# Patient Record
Sex: FEMALE | Race: WHITE | Employment: UNEMPLOYED | URBAN - METROPOLITAN AREA
[De-identification: names, ages, dates, MRNs, and addresses within clinical notes are randomized per-mention and may not be internally consistent; named-entity substitution may affect disease eponyms.]

---

## 2017-02-08 ENCOUNTER — THERAPY VISIT (OUTPATIENT)
Dept: PHYSICAL THERAPY | Facility: CLINIC | Age: 47
End: 2017-02-08
Payer: COMMERCIAL

## 2017-02-08 DIAGNOSIS — H81.10 BPPV (BENIGN PAROXYSMAL POSITIONAL VERTIGO), UNSPECIFIED LATERALITY: Primary | ICD-10-CM

## 2017-02-08 PROCEDURE — 95992 CANALITH REPOSITIONING PROC: CPT | Mod: GP | Performed by: PHYSICAL THERAPIST

## 2017-02-08 PROCEDURE — 97161 PT EVAL LOW COMPLEX 20 MIN: CPT | Mod: GP | Performed by: PHYSICAL THERAPIST

## 2017-02-08 NOTE — PROGRESS NOTES
"[facility] Initial Evaluation      S:  Papa Ruiz is a 46 year old patient complaining of vertigo and foggy feeling.  Onset of symptoms: Last month or two sx have been present, but last week has been the worst      Is this a recurrent problem: Last February had the same thing that was treated by PT  Progression since onset: Slowly getting worse  Frequency of episodes/time of day: 5 -6 x/ day.  Fogginess is constant throughout day.  Duration of episodes: 1 -2 minutes  Exacerbating factors: bending; rolling in bed;  Prolong walking  Relieving factors: sit still  Previous treatments:  Previous PT  Special tests/diagnostics performed: none  Significant medical hx: smoking, menopausal  Meds: ----  Occupation: none   Work requirements: 6 year old twins at home  General health reported by patient: good  Red Flags: none      O:  Cervical ROM screen: full - increases foggy feeling  Oculomotor/gaze stability screen: all tests negative  Vertebral artery test: negative  Hallpike-[place] maneuver:  R: negative  L: negative -slight dizziness only - mainly \"foggy\"  Horizontal canal test:  R: NT  L: NT  Balance testing:  NT          Subjective:    HPI                    Objective:    System    Physical Exam    General     ROS    Assessment/Plan:      Patient is a 46 year old female with vertigo complaints.    Patient has the following significant findings with corresponding treatment plan.                Diagnosis 1:  vertigo  Decreased proprioception - neuro re-education and therapeutic activities  Decreased function - therapeutic activities    Therapy Evaluation Codes:   1) History comprised of:   Personal factors that impact the plan of care:      Anxiety and None.    Comorbidity factors that impact the plan of care are:      Menopausal and Smoking.     Medications impacting care: None.  2) Examination of Body Systems comprised of:   Body structures and functions that impact the plan of care:  "     vertigo.   Activity limitations that impact the plan of care are:      Bending and Laying down.  3) Clinical presentation characteristics are:   Stable/Uncomplicated.  4) Decision-Making    Low complexity using standardized patient assessment instrument and/or measureable assessment of functional outcome.  Cumulative Therapy Evaluation is: Low complexity.    Previous and current functional limitations:  (See Goal Flow Sheet for this information)    Short term and Long term goals: (See Goal Flow Sheet for this information)     Communication ability:  Patient appears to be able to clearly communicate and understand verbal and written communication and follow directions correctly.  Treatment Explanation - The following has been discussed with the patient:   RX ordered/plan of care  Anticipated outcomes  Possible risks and side effects  This patient would benefit from PT intervention to resume normal activities. and Pt instructed to make appointment with MD to assess for other issues   Rehab potential is good.    Frequency:  1 X week, once daily  Duration:  for 4 weeks  Discharge Plan:  Achieve all LTG.  Independent in home treatment program.  Reach maximal therapeutic benefit.    Pt instructed to F/U with MD prior to scheduling additional visits.    Please refer to the daily flowsheet for treatment today, total treatment time and time spent performing 1:1 timed codes.

## 2017-02-08 NOTE — Clinical Note
"[facility] ATHLETIC [facility] PT  [address] [place]. \Hospital Has a New Name and Outlook.\"" [id number]  [phone]    2017    Re: Papa Ruiz   :   1970  MRN:  [id number]     REFERRING PHYSICIAN:   Javad Martinez  [facility] PT  Date of Initial Evaluation:  2017  Visits:1     Reason for Referral:  BPPV (benign paroxysmal positional vertigo), unspecified laterality  EVALUATION SUMMARY  [facility] Initial Evaluation  S:  Papa Ruiz is a 46 year old patient complaining of vertigo and foggy feeling.  Onset of symptoms: Last month or two sx have been present, but last week has been the worst      Is this a recurrent problem: Last February had the same thing that was treated by PT  Progression since onset: Slowly getting worse  Frequency of episodes/time of day: 5 -6 x/ day.  Fogginess is constant throughout day.  Duration of episodes: 1 -2 minutes  Exacerbating factors: bending; rolling in bed;  Prolong walking  Relieving factors: sit still  Previous treatments:  Previous PT  Special tests/diagnostics performed: none  Significant medical hx: smoking, menopausal  Meds: ----  Occupation: none   Work requirements: 6 year old twins at home  General health reported by patient: good  Red Flags: none  O:  Cervical ROM screen: full - increases foggy feeling  Oculomotor/gaze stability screen: all tests negative  Vertebral artery test: negative  Hallpike-[place] maneuver:  R: negative  L: negative -slight dizziness only - mainly \"foggy\"  Horizontal canal test:  R: NT  L: NT  Balance testing:  NT  Patient is a 46 year old female with vertigo complaints.    Patient has the following significant findings with corresponding treatment plan.                Diagnosis 1:  vertigo  Decreased proprioception - neuro re-education and therapeutic activities  Decreased function - therapeutic activities  Therapy Evaluation Codes:   1) History comprised " of:   Personal factors that impact the plan of care:      Anxiety and None.   Re: Papa Ruiz   :   1970         Comorbidity factors that impact the plan of care are:      Menopausal and Smoking.     Medications impacting care: None.  2) Examination of Body Systems comprised of:   Body structures and functions that impact the plan of care:      vertigo.   Activity limitations that impact the plan of care are:      Bending and Laying down.  3) Clinical presentation characteristics are:   Stable/Uncomplicated.  4) Decision-Making    Low complexity using standardized patient assessment instrument and/or measureable assessment of functional outcome.  Cumulative Therapy Evaluation is: Low complexity.    Previous and current functional limitations:  (See Goal Flow Sheet for this information)    Short term and Long term goals: (See Goal Flow Sheet for this information)     Communication ability:  Patient appears to be able to clearly communicate and understand verbal and written communication and follow directions correctly.  Treatment Explanation - The following has been discussed with the patient:   RX ordered/plan of care  Anticipated outcomes  Possible risks and side effects  This patient would benefit from PT intervention to resume normal activities. and Pt instructed to make appointment with MD to assess for other issues   Rehab potential is good.  Frequency:  1 X week, once daily  Duration:  for 4 weeks  Discharge Plan:  Achieve all LTG.  Independent in home treatment program.  Reach maximal therapeutic benefit.  Pt instructed to F/U with MD prior to scheduling additional visits.    Thank you for your referral.    INQUIRIES  Therapist: Harinder Cottrell PT  [facility]  [address] [facility] [address]  Phone: [phone]  Fax: [phone]

## 2017-02-20 ENCOUNTER — RADIANT APPOINTMENT (OUTPATIENT)
Dept: MAMMOGRAPHY | Facility: CLINIC | Age: 47
End: 2017-02-20
Attending: PHYSICIAN ASSISTANT
Payer: COMMERCIAL

## 2017-02-20 DIAGNOSIS — Z13.9 SCREENING: ICD-10-CM

## 2017-02-20 PROCEDURE — G0202 SCR MAMMO BI INCL CAD: HCPCS | Performed by: RADIOLOGY

## 2017-02-22 ENCOUNTER — OFFICE VISIT (OUTPATIENT)
Dept: PEDIATRICS | Facility: CLINIC | Age: 47
End: 2017-02-22
Payer: COMMERCIAL

## 2017-02-22 VITALS
OXYGEN SATURATION: 100 % | TEMPERATURE: 97 F | HEIGHT: 66 IN | WEIGHT: 142.9 LBS | SYSTOLIC BLOOD PRESSURE: 116 MMHG | DIASTOLIC BLOOD PRESSURE: 81 MMHG | BODY MASS INDEX: 22.97 KG/M2 | HEART RATE: 80 BPM

## 2017-02-22 DIAGNOSIS — R42 LIGHTHEADEDNESS: ICD-10-CM

## 2017-02-22 DIAGNOSIS — F41.9 ANXIETY: Primary | ICD-10-CM

## 2017-02-22 DIAGNOSIS — R74.8 ELEVATED LIPASE: ICD-10-CM

## 2017-02-22 LAB
ALBUMIN SERPL-MCNC: 4 G/DL (ref 3.4–5)
ALP SERPL-CCNC: 82 U/L (ref 40–150)
ALT SERPL W P-5'-P-CCNC: 29 U/L (ref 0–50)
ANION GAP SERPL CALCULATED.3IONS-SCNC: 6 MMOL/L (ref 3–14)
AST SERPL W P-5'-P-CCNC: 31 U/L (ref 0–45)
BASOPHILS # BLD AUTO: 0.1 10E9/L (ref 0–0.2)
BASOPHILS NFR BLD AUTO: 0.9 %
BILIRUB SERPL-MCNC: 0.5 MG/DL (ref 0.2–1.3)
BUN SERPL-MCNC: 8 MG/DL (ref 7–30)
CALCIUM SERPL-MCNC: 8.8 MG/DL (ref 8.5–10.1)
CHLORIDE SERPL-SCNC: 103 MMOL/L (ref 94–109)
CO2 SERPL-SCNC: 27 MMOL/L (ref 20–32)
CREAT SERPL-MCNC: 0.72 MG/DL (ref 0.52–1.04)
DIFFERENTIAL METHOD BLD: ABNORMAL
EOSINOPHIL # BLD AUTO: 0.4 10E9/L (ref 0–0.7)
EOSINOPHIL NFR BLD AUTO: 6.6 %
ERYTHROCYTE [DISTWIDTH] IN BLOOD BY AUTOMATED COUNT: 12.1 % (ref 10–15)
GFR SERPL CREATININE-BSD FRML MDRD: 87 ML/MIN/1.7M2
GLUCOSE SERPL-MCNC: 92 MG/DL (ref 70–99)
HCT VFR BLD AUTO: 38.2 % (ref 35–47)
HGB BLD-MCNC: 13.5 G/DL (ref 11.7–15.7)
LIPASE SERPL-CCNC: 405 U/L (ref 73–393)
LYMPHOCYTES # BLD AUTO: 1.4 10E9/L (ref 0.8–5.3)
LYMPHOCYTES NFR BLD AUTO: 24.9 %
MCH RBC QN AUTO: 35.4 PG (ref 26.5–33)
MCHC RBC AUTO-ENTMCNC: 35.3 G/DL (ref 31.5–36.5)
MCV RBC AUTO: 100 FL (ref 78–100)
MONOCYTES # BLD AUTO: 0.6 10E9/L (ref 0–1.3)
MONOCYTES NFR BLD AUTO: 9.8 %
NEUTROPHILS # BLD AUTO: 3.2 10E9/L (ref 1.6–8.3)
NEUTROPHILS NFR BLD AUTO: 57.8 %
PLATELET # BLD AUTO: 275 10E9/L (ref 150–450)
POTASSIUM SERPL-SCNC: 4.1 MMOL/L (ref 3.4–5.3)
PROT SERPL-MCNC: 8.2 G/DL (ref 6.8–8.8)
RBC # BLD AUTO: 3.81 10E12/L (ref 3.8–5.2)
SODIUM SERPL-SCNC: 136 MMOL/L (ref 133–144)
T3 SERPL-MCNC: 90 NG/DL (ref 60–181)
T4 FREE SERPL-MCNC: 0.83 NG/DL (ref 0.76–1.46)
TSH SERPL DL<=0.05 MIU/L-ACNC: 2.67 MU/L (ref 0.4–4)
WBC # BLD AUTO: 5.6 10E9/L (ref 4–11)

## 2017-02-22 PROCEDURE — 36415 COLL VENOUS BLD VENIPUNCTURE: CPT | Performed by: INTERNAL MEDICINE

## 2017-02-22 PROCEDURE — 99214 OFFICE O/P EST MOD 30 MIN: CPT | Performed by: INTERNAL MEDICINE

## 2017-02-22 PROCEDURE — 83690 ASSAY OF LIPASE: CPT | Performed by: INTERNAL MEDICINE

## 2017-02-22 PROCEDURE — 84480 ASSAY TRIIODOTHYRONINE (T3): CPT | Performed by: INTERNAL MEDICINE

## 2017-02-22 PROCEDURE — 80050 GENERAL HEALTH PANEL: CPT | Performed by: INTERNAL MEDICINE

## 2017-02-22 PROCEDURE — 84439 ASSAY OF FREE THYROXINE: CPT | Performed by: INTERNAL MEDICINE

## 2017-02-22 NOTE — NURSING NOTE
"Chief Complaint   Patient presents with     Dizziness     Anxiety       Initial /81 (BP Location: Right arm, Patient Position: Chair, Cuff Size: Adult Regular)  Pulse 80  Temp 97  F (36.1  C) (Oral)  Ht 5' 6.25\" (1.683 m)  Wt 142 lb 14.4 oz (64.8 kg)  SpO2 100%  BMI 22.89 kg/m2 Estimated body mass index is 22.89 kg/(m^2) as calculated from the following:    Height as of this encounter: 5' 6.25\" (1.683 m).    Weight as of this encounter: 142 lb 14.4 oz (64.8 kg).  Medication Reconciliation: complete       Romana Najera CMA    "

## 2017-02-22 NOTE — MR AVS SNAPSHOT
After Visit Summary   2017    Papa Ruiz    MRN: [id number]           Patient Information     Date Of Birth          1970        Visit Information        Provider Department      2017 9:40 AM Jose Shankar MD [facility]        Today's Diagnoses     Anxiety    -  1    Lightheadedness        Elevated lipase          Care Instructions    No caffiene    No alcohol    Go to lab    Return next week        Follow-ups after your visit        Follow-up notes from your care team     Return in about 1 year (around 2018).      Who to contact     If you have questions or need follow up information about today's clinic visit or your schedule please contact [facility] directly at [phone].  Normal or non-critical lab and imaging results will be communicated to you by MyChart, letter or phone within 4 business days after the clinic has received the results. If you do not hear from us within 7 days, please contact the clinic through MyChart or phone. If you have a critical or abnormal lab result, we will notify you by phone as soon as possible.  Submit refill requests through [employer] or call your pharmacy and they will forward the refill request to us. Please allow 3 business days for your refill to be completed.          Additional Information About Your Visit        MyChart Information     [employer] is an electronic gateway that provides easy, online access to your medical records. With [employer], you can request a clinic appointment, read your test results, renew a prescription or communicate with your care team.     To sign up for [employer] visit the website at [email or web address]   You will be asked to enter the access code listed below, as well as some personal information. Please follow the directions to create your username and password.     Your access code is: [email or web address]  Expires: 2017 10:14 AM     Your access code will  in  "90 days. If you need help or a new code, please contact your [facility] or call [phone] for assistance.        Care EveryWhere ID     This is your Care EveryWhere ID. This could be used by other organizations to access your [place] medical records  [id number]        Your Vitals Were     Pulse Temperature Height Pulse Oximetry BMI (Body Mass Index)       80 97  F (36.1  C) (Oral) 5' 6.25\" (1.683 m) 100% 22.89 kg/m2        Blood Pressure from Last 3 Encounters:   02/22/17 116/81   09/14/16 122/76   02/19/16 116/77    Weight from Last 3 Encounters:   02/22/17 142 lb 14.4 oz (64.8 kg)   09/14/16 136 lb 12.8 oz (62.1 kg)   06/11/15 140 lb (63.5 kg)              We Performed the Following     CBC with platelets and differential     Comprehensive metabolic panel (BMP + Alb, Alk Phos, ALT, AST, Total. Bili, TP)     Lipase     T3, total     T4, free     TSH        Primary Care Provider Office Phone # Fax #    Taylor Calvert PA-C [phone]       [facility] [place] [address] [facility] [address]        Thank you!     Thank you for choosing [facility]  for your care. Our goal is always to provide you with excellent care. Hearing back from our patients is one way we can continue to improve our services. Please take a few minutes to complete the written survey that you may receive in the mail after your visit with us. Thank you!             Your Updated Medication List - Protect others around you: Learn how to safely use, store and throw away your medicines at www.disposemymeds.org.          This list is accurate as of: 2/22/17 10:14 AM.  Always use your most recent med list.                   Brand Name Dispense Instructions for use    aspirin 81 MG chewable tablet      Take 81 mg by mouth daily       MULTIVITAMIN ADULT PO            "

## 2017-02-22 NOTE — PROGRESS NOTES
SUBJECTIVE:                                                    Papa Ruiz is a 46 year old female who presents to clinic today for the following health issues:      Anxiety Follow-Up- pt. States she was diagnosed x 1 year ago    Status since last visit: Worsened     Other associated symptoms:Dizziness , fogginess     Complicating factors:   Significant life event: No- pt states she has always been a nervous person  Current substance abuse: None  Depression symptoms: No  MITZY-7 SCORE 9/14/2016   Total Score 7        GAD7       Dizziness     Onset: Vertigo was diagnosed 1 year ago and last 10 months has worsened    Description:   Do you feel faint:  YES  Does it feel like the surroundings (bed, room) are moving: no   Unsteady/off balance: YES  Have you passed out or fallen: no     Intensity: severe, 8/10    Progression of Symptoms:  worsening and constant    Accompanying Signs & Symptoms:  Heart palpitations: no   Nausea, vomiting: YES- nausea   Weakness in arms or legs: no   Fatigue: YES  Vision or speech changes: YES- pt. States her vision is foggy and isn't able to complete sentences   Ringing in ears (Tinnitus): no   Hearing Loss: no     Pt. States she has been forgetful    History:   Head trauma/concussion hx: no   Previous similar symptoms: YES- last year or two  Recent bleeding history: no     Precipitating factors:   Worse with activity or head movement: YES  Any new medications (BP?): no   Alcohol/drug abuse/withdrawal: no     Alleviating factors:   Does staying in a fixed position give relief:  YES      Pt. States she may have had a minor stroke 4 years ago she was unable to stand up for 30 mins.    Pt. States she was seen at [facility] for her vertigo.     Therapies Tried and outcome: Pt. States she has not taken anything for her vertigo.      HPI:     PMH:   Patient Active Problem List   Diagnosis     Transient neurologic deficit     Disturbance of skin sensation     Anxiety     Tobacco use  "disorder     BPPV (benign paroxysmal positional vertigo), unspecified laterality     Past Surgical History   Procedure Laterality Date     Gyn surgery         Social History   Substance Use Topics     Smoking status: Former Smoker     Quit date: 1/22/2017     Smokeless tobacco: Never Used      Comment: 1-2 cigarettes a day     Alcohol use No     History reviewed. No pertinent family history.      Current Outpatient Prescriptions   Medication Sig Dispense Refill     Multiple Vitamins-Minerals (MULTIVITAMIN ADULT PO)        aspirin 81 MG chewable tablet Take 81 mg by mouth daily       Allergies   Allergen Reactions     Nkda [No Known Drug Allergies]        ROS:  C: NEGATIVE for fever, chills, change in weight  E/M: NEGATIVE for ear, mouth and throat problems  R: NEGATIVE for significant cough or SOB  CV: NEGATIVE for chest pain, palpitations or peripheral edema  GI: NEGATIVE for nausea, abdominal pain, heartburn, or change in bowel habits  : normal menstrual cycles  MUSCULOSKELETAL: NEGATIVE for significant arthralgias or myalgia  ENDOCRINE: NEGATIVE for temperature intolerance, skin/hair changes  PSYCHIATRIC: agitation, anxiety and fatigue    OBJECTIVE:                                                    /81 (BP Location: Right arm, Patient Position: Chair, Cuff Size: Adult Regular)  Pulse 80  Temp 97  F (36.1  C) (Oral)  Ht 5' 6.25\" (1.683 m)  Wt 142 lb 14.4 oz (64.8 kg)  SpO2 100%  BMI 22.89 kg/m2  Body mass index is 22.89 kg/(m^2).  GENERAL: healthy, alert and no distress  EYES: Eyes grossly normal to inspection  HENT: normal cephalic/atraumatic, nose and mouth without ulcers or lesions, oropharynx clear and oral mucous membranes moist  NECK: no adenopathy, no asymmetry, masses, or scars and thyroid normal to palpation  RESP: lungs clear to auscultation - no rales, rhonchi or wheezes  CV: regular rates and rhythm, normal S1 S2, no S3 or S4, no murmur, click or rub and no peripheral edema  ABDOMEN: soft, " nontender, no hepatosplenomegaly, no masses and bowel sounds normal  MS: no gross musculoskeletal defects noted, no edema  NEURO: Normal strength and tone, sensory exam grossly normal, mentation intact, cranial nerves 2-12 intact, gait normal including heel/toe/tandem walking and Romberg normal. No tremor  PSYCH: mentation appears normal, anxious, judgement and insight intact and appearance well groomed    Diagnostic Test Results:  none      ASSESSMENT/PLAN:                                                    1. Anxiety    This patient is very anxious and I will see back next week after initial labs. She calmed down quite a bit in the office just with me reassuring her that the exam was fine. Need to rule out hyperthyroidism. May be a candidate for beta blocker therapy    - T3, total  - T4, free  - TSH    2. Lightheadedness    Patient complains of dizziness but this is more of a lightheadedness sensation rather than true vertigo.  She is to stop using caffeine and alcohol and I'll recheck next week after lab  - CBC with platelets and differential  - Comprehensive metabolic panel (BMP + Alb, Alk Phos, ALT, AST, Total. Bili, TP)  - T3, total  - T4, free  - TSH    3. Elevated lipase    Elevated lipase in the past will recheck    - Lipase        Will call or return to clinic if worsening or symptoms not improving as discussed.  See Patient Instructions      Jose Shankar MD  [facility]

## 2017-03-01 ENCOUNTER — TELEPHONE (OUTPATIENT)
Dept: PEDIATRICS | Facility: CLINIC | Age: 47
End: 2017-03-01

## 2017-03-01 NOTE — TELEPHONE ENCOUNTER
[facility] Call Center    Phone Message    Name of Caller: Papa    [phone] (home)   Best time to return call: any    May a detailed message be left on voicemail: yes    Relation to patient: Self    Reason for Call: Requesting Results   Name/type of test: labs  Date of test: Dr Shankar labs, send results to    [address] [facility] [place] [address]  Was test done at a location other than [facility] (Please fill in the location if not [facility])?: No      Action Taken: Message routed to:  Primary Care p [phone]

## 2017-06-06 PROBLEM — H81.10 BPPV (BENIGN PAROXYSMAL POSITIONAL VERTIGO), UNSPECIFIED LATERALITY: Status: RESOLVED | Noted: 2017-02-08 | Resolved: 2017-06-06

## 2017-06-06 NOTE — PROGRESS NOTES
6/6/2017 - Initial eval only. Was instructed to F/U with MD after PT visit.  Discharge pt from PT.

## 2017-10-21 ENCOUNTER — HEALTH MAINTENANCE LETTER (OUTPATIENT)
Age: 47
End: 2017-10-21

## 2018-07-12 ENCOUNTER — TRANSFERRED RECORDS (OUTPATIENT)
Dept: HEALTH INFORMATION MANAGEMENT | Facility: CLINIC | Age: 48
End: 2018-07-12

## 2018-10-31 ENCOUNTER — TRANSFERRED RECORDS (OUTPATIENT)
Dept: HEALTH INFORMATION MANAGEMENT | Facility: CLINIC | Age: 48
End: 2018-10-31

## 2020-01-24 ENCOUNTER — OFFICE VISIT (OUTPATIENT)
Dept: FAMILY MEDICINE | Facility: CLINIC | Age: 50
End: 2020-01-24
Payer: COMMERCIAL

## 2020-01-24 VITALS
SYSTOLIC BLOOD PRESSURE: 130 MMHG | HEIGHT: 66 IN | RESPIRATION RATE: 16 BRPM | TEMPERATURE: 98.7 F | WEIGHT: 121.6 LBS | DIASTOLIC BLOOD PRESSURE: 74 MMHG | HEART RATE: 92 BPM | BODY MASS INDEX: 19.54 KG/M2

## 2020-01-24 DIAGNOSIS — L29.9 CHRONIC PRURITUS: ICD-10-CM

## 2020-01-24 DIAGNOSIS — B86 SCABIES: Primary | ICD-10-CM

## 2020-01-24 DIAGNOSIS — D50.9 IRON DEFICIENCY ANEMIA, UNSPECIFIED IRON DEFICIENCY ANEMIA TYPE: ICD-10-CM

## 2020-01-24 DIAGNOSIS — L03.90 CELLULITIS, UNSPECIFIED CELLULITIS SITE: ICD-10-CM

## 2020-01-24 DIAGNOSIS — R79.89 ELEVATED LFTS: ICD-10-CM

## 2020-01-24 LAB
ALBUMIN SERPL-MCNC: 3.5 G/DL (ref 3.4–5)
ALP SERPL-CCNC: 99 U/L (ref 40–150)
ALT SERPL W P-5'-P-CCNC: 35 U/L (ref 0–50)
ANION GAP SERPL CALCULATED.3IONS-SCNC: 6 MMOL/L (ref 3–14)
AST SERPL W P-5'-P-CCNC: 37 U/L (ref 0–45)
BASOPHILS # BLD AUTO: 0.1 10E9/L (ref 0–0.2)
BASOPHILS NFR BLD AUTO: 0.6 %
BILIRUB SERPL-MCNC: 0.2 MG/DL (ref 0.2–1.3)
BUN SERPL-MCNC: 6 MG/DL (ref 7–30)
CALCIUM SERPL-MCNC: 8.9 MG/DL (ref 8.5–10.1)
CHLORIDE SERPL-SCNC: 103 MMOL/L (ref 94–109)
CO2 SERPL-SCNC: 25 MMOL/L (ref 20–32)
CREAT SERPL-MCNC: 0.56 MG/DL (ref 0.52–1.04)
DIFFERENTIAL METHOD BLD: ABNORMAL
EOSINOPHIL # BLD AUTO: 1.1 10E9/L (ref 0–0.7)
EOSINOPHIL NFR BLD AUTO: 14.4 %
ERYTHROCYTE [DISTWIDTH] IN BLOOD BY AUTOMATED COUNT: 11.6 % (ref 10–15)
FOLATE SERPL-MCNC: 17.2 NG/ML
GFR SERPL CREATININE-BSD FRML MDRD: >90 ML/MIN/{1.73_M2}
GLUCOSE SERPL-MCNC: 101 MG/DL (ref 70–99)
HCT VFR BLD AUTO: 34.3 % (ref 35–47)
HGB BLD-MCNC: 11.5 G/DL (ref 11.7–15.7)
INR PPP: 1.12 (ref 0.86–1.14)
IRON SATN MFR SERPL: 19 % (ref 15–46)
IRON SERPL-MCNC: 61 UG/DL (ref 35–180)
LYMPHOCYTES # BLD AUTO: 1.8 10E9/L (ref 0.8–5.3)
LYMPHOCYTES NFR BLD AUTO: 22.8 %
MCH RBC QN AUTO: 35.4 PG (ref 26.5–33)
MCHC RBC AUTO-ENTMCNC: 33.5 G/DL (ref 31.5–36.5)
MCV RBC AUTO: 106 FL (ref 78–100)
MONOCYTES # BLD AUTO: 0.6 10E9/L (ref 0–1.3)
MONOCYTES NFR BLD AUTO: 7.2 %
NEUTROPHILS # BLD AUTO: 4.3 10E9/L (ref 1.6–8.3)
NEUTROPHILS NFR BLD AUTO: 55 %
PLATELET # BLD AUTO: 281 10E9/L (ref 150–450)
POTASSIUM SERPL-SCNC: 3.4 MMOL/L (ref 3.4–5.3)
PROT SERPL-MCNC: 7.9 G/DL (ref 6.8–8.8)
RBC # BLD AUTO: 3.25 10E12/L (ref 3.8–5.2)
SODIUM SERPL-SCNC: 134 MMOL/L (ref 133–144)
TIBC SERPL-MCNC: 324 UG/DL (ref 240–430)
TSH SERPL DL<=0.005 MIU/L-ACNC: 2.17 MU/L (ref 0.4–4)
URATE SERPL-MCNC: 4.6 MG/DL (ref 2.6–6)
VIT B12 SERPL-MCNC: 992 PG/ML (ref 193–986)
WBC # BLD AUTO: 7.8 10E9/L (ref 4–11)

## 2020-01-24 PROCEDURE — 82607 VITAMIN B-12: CPT | Performed by: NURSE PRACTITIONER

## 2020-01-24 PROCEDURE — 83540 ASSAY OF IRON: CPT | Performed by: NURSE PRACTITIONER

## 2020-01-24 PROCEDURE — 86705 HEP B CORE ANTIBODY IGM: CPT | Performed by: NURSE PRACTITIONER

## 2020-01-24 PROCEDURE — 83550 IRON BINDING TEST: CPT | Performed by: NURSE PRACTITIONER

## 2020-01-24 PROCEDURE — 36415 COLL VENOUS BLD VENIPUNCTURE: CPT | Performed by: NURSE PRACTITIONER

## 2020-01-24 PROCEDURE — 86803 HEPATITIS C AB TEST: CPT | Performed by: NURSE PRACTITIONER

## 2020-01-24 PROCEDURE — 82746 ASSAY OF FOLIC ACID SERUM: CPT | Performed by: NURSE PRACTITIONER

## 2020-01-24 PROCEDURE — 84550 ASSAY OF BLOOD/URIC ACID: CPT | Performed by: NURSE PRACTITIONER

## 2020-01-24 PROCEDURE — 87340 HEPATITIS B SURFACE AG IA: CPT | Performed by: NURSE PRACTITIONER

## 2020-01-24 PROCEDURE — 86709 HEPATITIS A IGM ANTIBODY: CPT | Performed by: NURSE PRACTITIONER

## 2020-01-24 PROCEDURE — 85610 PROTHROMBIN TIME: CPT | Performed by: NURSE PRACTITIONER

## 2020-01-24 PROCEDURE — 99214 OFFICE O/P EST MOD 30 MIN: CPT | Performed by: NURSE PRACTITIONER

## 2020-01-24 PROCEDURE — 80050 GENERAL HEALTH PANEL: CPT | Performed by: NURSE PRACTITIONER

## 2020-01-24 PROCEDURE — 82306 VITAMIN D 25 HYDROXY: CPT | Performed by: NURSE PRACTITIONER

## 2020-01-24 RX ORDER — IVERMECTIN 3 MG/1
TABLET ORAL
COMMUNITY
Start: 2020-01-03 | End: 2020-10-02

## 2020-01-24 RX ORDER — IVERMECTIN 3 MG/1
3 TABLET ORAL ONCE
Qty: 8 TABLET | Refills: 0 | Status: SHIPPED | OUTPATIENT
Start: 2020-01-24 | End: 2020-01-24

## 2020-01-24 RX ORDER — ETHYL CHLORIDE 100 %
AEROSOL, SPRAY (ML) TOPICAL
Qty: 103.5 ML | Refills: 1 | Status: SHIPPED | OUTPATIENT
Start: 2020-01-24 | End: 2020-10-02

## 2020-01-24 RX ORDER — HYDROXYZINE PAMOATE 25 MG/1
25 CAPSULE ORAL 3 TIMES DAILY PRN
Qty: 30 CAPSULE | Refills: 0 | Status: SHIPPED | OUTPATIENT
Start: 2020-01-24 | End: 2020-10-02

## 2020-01-24 RX ORDER — PREDNISONE 20 MG/1
TABLET ORAL
Qty: 26 TABLET | Refills: 0 | Status: SHIPPED | OUTPATIENT
Start: 2020-01-24 | End: 2020-10-02

## 2020-01-24 RX ORDER — CEPHALEXIN 500 MG/1
500 CAPSULE ORAL 4 TIMES DAILY
Qty: 40 CAPSULE | Refills: 0 | Status: SHIPPED | OUTPATIENT
Start: 2020-01-24 | End: 2020-02-03

## 2020-01-24 RX ORDER — PERMETHRIN 50 MG/G
CREAM TOPICAL
COMMUNITY
Start: 2019-12-29 | End: 2020-10-02

## 2020-01-24 ASSESSMENT — PAIN SCALES - GENERAL: PAINLEVEL: NO PAIN (0)

## 2020-01-24 ASSESSMENT — MIFFLIN-ST. JEOR: SCORE: 1194.35

## 2020-01-24 NOTE — PROGRESS NOTES
Subjective     Papa Ruiz is a 49 year old female who presents to clinic today for the following health issues:    HPI     Rash      Duration: 2 months    Description  Location: forearms, back, right thigh, shins, back  Itching: severe    Intensity:  severe    Accompanying signs and symptoms: red, scabbing extremely itchy rash    History (similar episodes/previous evaluation): [facility], Urgent Care once    Precipitating or alleviating factors:  New exposures:  None  Recent travel: no      Therapies tried and outcome: Benadryl, Ivermectin, Permethrin not effective. Vinegar on rash helped for awhile.     Current Outpatient Medications   Medication Sig Dispense Refill     cephALEXin (KEFLEX) 500 MG capsule Take 1 capsule (500 mg) by mouth 4 times daily for 10 days 40 capsule 0     diphenhydrAMINE HCl (BENADRYL PO)        ethyl chloride external spray Apply three times per day as needed for itching. 103.5 mL 1     hydrOXYzine (VISTARIL) 25 MG capsule Take 1 capsule (25 mg) by mouth 3 times daily as needed for itching 30 capsule 0     Ibuprofen (ADVIL PO)        Ibuprofen-diphenhydrAMINE Cit (ADVIL PM PO)        ivermectin (STROMECTOL) 3 MG TABS tablet Take 1 tablet (3 mg) by mouth once for 1 dose Take 4 tabs today and then repeat in 14 days 8 tablet 0     Multiple Vitamins-Minerals (MULTIVITAMIN ADULT PO)        predniSONE (DELTASONE) 20 MG tablet Take 3 pills a day for 5 days and then 2 pills a day for 3 days and then 1 pill a day for 3 days and then 1/2 pill a day for 3 days. 26 tablet 0     aspirin 81 MG chewable tablet Take 81 mg by mouth daily       ivermectin (STROMECTOL) 3 MG TABS tablet        permethrin (ELIMITE) 5 % external cream APPLY TO ENTIRE BODY FROM NECK TO FEET FOR 1 DOSE. WASH OFF AFTER 8 TO 14 HOURS       Allergies   Allergen Reactions     Nkda [No Known Drug Allergies]        Reviewed and updated as needed this visit by Provider         Has had rash for the last 2 months. Did  "cream and also pills and was told had scabies. Has been taking benadryl and that does not help. Does not feel like it got better. Took all of the pills and then did get new areas to back or tailbone. No one else in home has this. Did have prescription for prednisone and that did help to decrease the itching. Is taking advil PM at night to help with sleep. Is worse at night. Took the pills just as prescribed.           Objective    /74 (BP Location: Left arm, Patient Position: Sitting, Cuff Size: Adult Regular)   Pulse 92   Temp 98.7  F (37.1  C) (Tympanic)   Resp 16   Ht 1.67 m (5' 5.75\")   Wt 55.2 kg (121 lb 9.6 oz)   LMP 09/13/2016   BMI 19.78 kg/m    Body mass index is 19.78 kg/m .          Assessment & Plan     Review of Systems   Skin: Positive for rash.       Physical Exam     See pictures       Right arm        Bilat legs        back      Left upper arm        1. Scabies  Did not take initial prescription for scabies treatment appropriately.  Will retreat here today.  Much education given   - ivermectin (STROMECTOL) 3 MG TABS tablet; Take 1 tablet (3 mg) by mouth once for 1 dose Take 4 tabs today and then repeat in 14 days  Dispense: 8 tablet; Refill: 0  - cephALEXin (KEFLEX) 500 MG capsule; Take 1 capsule (500 mg) by mouth 4 times daily for 10 days  Dispense: 40 capsule; Refill: 0    2. Chronic pruritus  We will check additional labs here today.  Educated on use of medications.  To follow-up with dermatology if no improvement.  - Comprehensive metabolic panel  - TSH with free T4 reflex  - hydrOXYzine (VISTARIL) 25 MG capsule; Take 1 capsule (25 mg) by mouth 3 times daily as needed for itching  Dispense: 30 capsule; Refill: 0  - ethyl chloride external spray; Apply three times per day as needed for itching.  Dispense: 103.5 mL; Refill: 1  - Uric acid  - DERMATOLOGY REFERRAL  - predniSONE (DELTASONE) 20 MG tablet; Take 3 pills a day for 5 days and then 2 pills a day for 3 days and then 1 pill a " day for 3 days and then 1/2 pill a day for 3 days.  Dispense: 26 tablet; Refill: 0    3. Elevated LFTs  - Comprehensive metabolic panel  - Hepatitis A antibody IgM  - Hepatitis B core antibody IgM  - Hepatitis B surface antigen  - Hepatitis C Screen Reflex to HCV RNA Quant and Genotype  - INR  - Vitamin D Deficiency    4. Iron deficiency anemia, unspecified iron deficiency anemia type  - CBC with platelets differential  - Iron and iron binding capacity  - Folate  - Vitamin B12    5. Cellulitis, unspecified cellulitis site  Likely secondary to itching.  Educated on use of medication.  Notify if no improvement   - cephALEXin (KEFLEX) 500 MG capsule; Take 1 capsule (500 mg) by mouth 4 times daily for 10 days  Dispense: 40 capsule; Refill: 0      During this visit greater than 80% of the 38 minutes for this appointment was spent in counseling and/or coordinating care of above stated issues.     No follow-ups on file.    ANYA Horner [facility]

## 2020-01-24 NOTE — PATIENT INSTRUCTIONS
These are general instructions and may not be specific to you. Please call, email or follow up if you have any questions or concerns.   Patient Education     Scabies  Scabies is a skin infection. It is caused by a tiny parasitic insect, or mite, that is too small to see directly. It can be seen under a microscope, but it is usually recognized only by the rash and symptoms it causes. This can make it hard to diagnose since the signs and symptoms can be similar to other diseases.  The scabies mite tunnels under the skin. It creates a small burrow, where it leaves its eggs. These eggs flores and grow into adults. They then create new burrows over the next 1 to 2 weeks. The mites die in about 4 to 6 weeks. The rash and itching are caused by an allergic reaction to the scabies saliva or feces.  Scabies is highly contagious. It is spread by direct skin contact. It is easily spread by close personal contact, sexual contact, or by sharing bed linens or clothing used by an infected person.  It may take 4 to 6 weeks for symptoms to appear after being exposed. Everyone living in the house with you, as well as your sexual partners, should be treated at the same time. After the first treatment, you will no longer be contagious. You may return to work, school or .  Home care    Machine wash in hot water all sheets, towels, pillowcases, underwear, pajamas, and any other clothing you have worn lately. Use the hot cycle of a dryer or use a hot iron to sterilize.    Seal anything that is hard to wash in a plastic trash bag for 4 days. This includes coats, jackets, blankets, and bedspreads. (The insects die after 3 days off the human body.)  Medicines  Scabicides  Medicines used to treat scabies are called scabicides. These are creams that kill the scabies mites. A prescription is needed. When using these medicines:    Always follow instructions provided by your healthcare provider and pharmacist. Also follow the printed  instructions that come with the medicine.    Talk with your provider about precautions to take when using these medicines.    Use the cream on your body when your skin is cool and dry. Don t use it after a hot shower or bath.    Usually the cream is put on your whole body. This means from your chin all the way down to your toes. Scabies does not usually affect an adult s head. So cream is not needed there. For children, discuss this with your child s provider.    Leave the cream or lotion on for the recommended amount of time. This is usually 8 to 12 hours.    Don t leave cream or lotion on your skin longer than directed. Don t use more than recommended.    Clean clothes should be worn after the treatment.    If you wash your hands after using the cream, you will need to reapply the cream to your hands.    If you are breastfeeding, wash off your nipples before feeding. Then reapply the cream after breastfeeding.    For babies or infants, put mittens on their hands. This will stop them from licking the cream or lotion. It will also stop them from scratching themselves because of the itching.  Other medicines    An oral medicine called ivermectin may be prescribed for severe cases. It may also be used if you can t apply creams.    Itching may cause the most discomfort. If large areas of your skin are affected, over-the-counter antihistamines may be used to reduce itching. Or you may be given a prescription antihistamine. Some of these medicines may make you sleepy. They are best used at bedtime. Antihistamines that don t make you sleepy can be used during the day. Note: Don t use medicine that has diphenhydramine if you have glaucoma, or if you are a man who has trouble urinating due to an enlarged prostate.    If you were given antibiotics due to a bacterial infection, take them until they are finished. It is important to finish the antibiotics even if the wound looks better. This is to make sure the infection has  cleared.  Follow-up care  Follow up with your healthcare provider, or as advised. Call your provider if your symptoms don t improve after 1 week, or if new burrows or rashes appear.  When to seek medical advice  Call your healthcare provider right away if any of these occur:    Yellow-brown crusts or drainage from the sores    Other signs of infection, including increasing redness, swelling, pain, or pus    Fever of 100.4 F (38 C) or higher, or as directed by your provider  Date Last Reviewed: 8/1/2016 2000-2019 [employer]. [address] [place] [id number]. All rights reserved. This information is not intended as a substitute for professional medical care. Always follow your healthcare professional's instructions.

## 2020-01-26 LAB
DEPRECATED CALCIDIOL+CALCIFEROL SERPL-MC: 35 UG/L (ref 20–75)
HAV IGM SERPL QL IA: NONREACTIVE
HBV CORE IGM SERPL QL IA: NONREACTIVE
HBV SURFACE AG SERPL QL IA: NONREACTIVE
HCV AB SERPL QL IA: NONREACTIVE

## 2020-03-03 ENCOUNTER — TELEPHONE (OUTPATIENT)
Dept: FAMILY MEDICINE | Facility: CLINIC | Age: 50
End: 2020-03-03

## 2020-03-03 NOTE — TELEPHONE ENCOUNTER
Patient states she has not been feeling good for the past week. States she wakes up early feeling stuffy and like she has a head cold but then it goes away a few hours later. States she is having nausea off and on along with a headache(not the worst headache she has ever had) in her forehead that has been lingering for a while now too. She is having dizziness with this along with numbness that she had in her right leg last week and in her fingers this week. She denies a cough or fever. Advised she be seen in UC/ER to be assessed. She agreed with this plan.    Michelle Horner RN

## 2020-09-19 ENCOUNTER — TRANSFERRED RECORDS (OUTPATIENT)
Dept: HEALTH INFORMATION MANAGEMENT | Facility: CLINIC | Age: 50
End: 2020-09-19

## 2020-09-19 LAB — INR PPP: 1.2

## 2020-09-21 LAB
ALT SERPL-CCNC: 37 IU/L (ref 8–45)
AST SERPL-CCNC: 109 IU/L (ref 2–40)
CHOLEST SERPL-MCNC: 129 MG/DL (ref 100–199)
HDLC SERPL-MCNC: 20 MG/DL
LDLC SERPL CALC-MCNC: 87 MG/DL
NONHDLC SERPL-MCNC: 109 MG/DL
TRIGL SERPL-MCNC: 110 MG/DL

## 2020-09-23 LAB
CREAT SERPL-MCNC: 0.59 MG/DL (ref 0.57–1.11)
GFR SERPL CREATININE-BSD FRML MDRD: >60 ML/MIN/1.73M2
GLUCOSE SERPL-MCNC: 107 MG/DL (ref 65–100)
POTASSIUM SERPL-SCNC: 3.5 MMOL/L (ref 3.5–5)

## 2020-09-24 ENCOUNTER — MEDICAL CORRESPONDENCE (OUTPATIENT)
Dept: HEALTH INFORMATION MANAGEMENT | Facility: CLINIC | Age: 50
End: 2020-09-24

## 2020-10-01 ENCOUNTER — TELEPHONE (OUTPATIENT)
Dept: VASCULAR SURGERY | Facility: CLINIC | Age: 50
End: 2020-10-01

## 2020-10-01 PROBLEM — D62 ACUTE BLOOD LOSS ANEMIA: Status: ACTIVE | Noted: 2020-09-19

## 2020-10-01 PROBLEM — F10.20 ALCOHOL DEPENDENCE (H): Status: ACTIVE | Noted: 2020-09-19

## 2020-10-01 PROBLEM — K85.20 ALCOHOL-INDUCED ACUTE PANCREATITIS: Status: ACTIVE | Noted: 2018-07-12

## 2020-10-01 PROBLEM — E87.6 HYPOKALEMIA: Status: ACTIVE | Noted: 2020-09-19

## 2020-10-01 PROBLEM — K70.10 ALCOHOLIC HEPATITIS (H): Status: ACTIVE | Noted: 2020-09-19

## 2020-10-01 PROBLEM — K27.9 PEPTIC ULCER DISEASE: Status: ACTIVE | Noted: 2020-09-19

## 2020-10-01 PROBLEM — K92.2 GI BLEED: Status: ACTIVE | Noted: 2020-09-19

## 2020-10-01 PROBLEM — E87.1 HYPONATREMIA: Status: ACTIVE | Noted: 2020-09-19

## 2020-10-01 NOTE — TELEPHONE ENCOUNTER
I called Papa's cell and emergency contact's number twice.  I left  requesting a page/call back to discuss.  Pt has been referred to IR here for Splenic PSA, Dr Mckeon has reviewed CT scan done at outside institution pushed into PACs system.  Pt will be urged to come to the ED for admission and evaluation.  Due to size of this pseudoaneurysm rupture risk is high.    GEORGETTE Rangel RN, BSN  Interventional Radiology Nurse Coordinator   Phone:  [phone]

## 2020-10-02 ENCOUNTER — APPOINTMENT (OUTPATIENT)
Dept: CT IMAGING | Facility: CLINIC | Age: 50
DRG: 271 | End: 2020-10-02
Attending: STUDENT IN AN ORGANIZED HEALTH CARE EDUCATION/TRAINING PROGRAM
Payer: COMMERCIAL

## 2020-10-02 ENCOUNTER — HOSPITAL ENCOUNTER (INPATIENT)
Facility: CLINIC | Age: 50
LOS: 2 days | Discharge: HOME | DRG: 271 | End: 2020-10-04
Attending: EMERGENCY MEDICINE | Admitting: STUDENT IN AN ORGANIZED HEALTH CARE EDUCATION/TRAINING PROGRAM
Payer: COMMERCIAL

## 2020-10-02 ENCOUNTER — APPOINTMENT (OUTPATIENT)
Dept: GENERAL RADIOLOGY | Facility: CLINIC | Age: 50
DRG: 271 | End: 2020-10-02
Attending: STUDENT IN AN ORGANIZED HEALTH CARE EDUCATION/TRAINING PROGRAM
Payer: COMMERCIAL

## 2020-10-02 DIAGNOSIS — I72.8 ANEURYSM OF SPLENIC ARTERY (H): ICD-10-CM

## 2020-10-02 DIAGNOSIS — Z20.828 CONTACT WITH AND (SUSPECTED) EXPOSURE TO OTHER VIRAL COMMUNICABLE DISEASES: ICD-10-CM

## 2020-10-02 DIAGNOSIS — K85.90 ACUTE PANCREATITIS, UNSPECIFIED COMPLICATION STATUS, UNSPECIFIED PANCREATITIS TYPE: ICD-10-CM

## 2020-10-02 LAB
ABO + RH BLD: NORMAL
ABO + RH BLD: NORMAL
ALBUMIN SERPL-MCNC: 3.8 G/DL (ref 3.4–5)
ALBUMIN UR-MCNC: NEGATIVE MG/DL
ALP SERPL-CCNC: 110 U/L (ref 40–150)
ALT SERPL W P-5'-P-CCNC: 96 U/L (ref 0–50)
ANION GAP SERPL CALCULATED.3IONS-SCNC: 4 MMOL/L (ref 3–14)
APPEARANCE UR: CLEAR
AST SERPL W P-5'-P-CCNC: 149 U/L (ref 0–45)
BASOPHILS # BLD AUTO: 0.2 10E9/L (ref 0–0.2)
BASOPHILS NFR BLD AUTO: 2.4 %
BILIRUB SERPL-MCNC: 0.2 MG/DL (ref 0.2–1.3)
BILIRUB UR QL STRIP: NEGATIVE
BLD GP AB SCN SERPL QL: NORMAL
BLOOD BANK CMNT PATIENT-IMP: NORMAL
BUN SERPL-MCNC: 8 MG/DL (ref 7–30)
CALCIUM SERPL-MCNC: 9.3 MG/DL (ref 8.5–10.1)
CHLORIDE SERPL-SCNC: 105 MMOL/L (ref 94–109)
CO2 SERPL-SCNC: 29 MMOL/L (ref 20–32)
COLOR UR AUTO: YELLOW
CREAT SERPL-MCNC: 0.51 MG/DL (ref 0.52–1.04)
DIFFERENTIAL METHOD BLD: ABNORMAL
EOSINOPHIL # BLD AUTO: 0.9 10E9/L (ref 0–0.7)
EOSINOPHIL NFR BLD AUTO: 11 %
ERYTHROCYTE [DISTWIDTH] IN BLOOD BY AUTOMATED COUNT: 14.7 % (ref 10–15)
ETHANOL SERPL-MCNC: <0.01 G/DL
GFR SERPL CREATININE-BSD FRML MDRD: >90 ML/MIN/{1.73_M2}
GLUCOSE SERPL-MCNC: 92 MG/DL (ref 70–99)
GLUCOSE UR STRIP-MCNC: NEGATIVE MG/DL
HCT VFR BLD AUTO: 32.6 % (ref 35–47)
HGB BLD-MCNC: 10.2 G/DL (ref 11.7–15.7)
HGB UR QL STRIP: NEGATIVE
IMM GRANULOCYTES # BLD: 0 10E9/L (ref 0–0.4)
IMM GRANULOCYTES NFR BLD: 0.3 %
INR PPP: 1.01 (ref 0.86–1.14)
INTERPRETATION ECG - MUSE: NORMAL
KETONES UR STRIP-MCNC: NEGATIVE MG/DL
LABORATORY COMMENT REPORT: NORMAL
LEUKOCYTE ESTERASE UR QL STRIP: NEGATIVE
LIPASE SERPL-CCNC: 1320 U/L (ref 73–393)
LIPASE SERPL-CCNC: 1734 U/L (ref 73–393)
LYMPHOCYTES # BLD AUTO: 2.4 10E9/L (ref 0.8–5.3)
LYMPHOCYTES NFR BLD AUTO: 29.7 %
MCH RBC QN AUTO: 33.4 PG (ref 26.5–33)
MCHC RBC AUTO-ENTMCNC: 31.3 G/DL (ref 31.5–36.5)
MCV RBC AUTO: 107 FL (ref 78–100)
MONOCYTES # BLD AUTO: 0.7 10E9/L (ref 0–1.3)
MONOCYTES NFR BLD AUTO: 8.6 %
NEUTROPHILS # BLD AUTO: 3.8 10E9/L (ref 1.6–8.3)
NEUTROPHILS NFR BLD AUTO: 48 %
NITRATE UR QL: NEGATIVE
NRBC # BLD AUTO: 0 10*3/UL
NRBC BLD AUTO-RTO: 0 /100
PH UR STRIP: 6.5 PH (ref 5–7)
PLATELET # BLD AUTO: 598 10E9/L (ref 150–450)
POTASSIUM SERPL-SCNC: 3.6 MMOL/L (ref 3.4–5.3)
PROT SERPL-MCNC: 8.8 G/DL (ref 6.8–8.8)
RBC # BLD AUTO: 3.05 10E12/L (ref 3.8–5.2)
RBC #/AREA URNS AUTO: <1 /HPF (ref 0–2)
SARS-COV-2 RNA SPEC QL NAA+PROBE: NEGATIVE
SARS-COV-2 RNA SPEC QL NAA+PROBE: NORMAL
SODIUM SERPL-SCNC: 138 MMOL/L (ref 133–144)
SOURCE: ABNORMAL
SP GR UR STRIP: 1.01 (ref 1–1.03)
SPECIMEN EXP DATE BLD: NORMAL
SPECIMEN SOURCE: NORMAL
SPECIMEN SOURCE: NORMAL
SQUAMOUS #/AREA URNS AUTO: 2 /HPF (ref 0–1)
TRANS CELLS #/AREA URNS HPF: <1 /HPF (ref 0–1)
TROPONIN I SERPL-MCNC: <0.015 UG/L (ref 0–0.04)
UROBILINOGEN UR STRIP-MCNC: NORMAL MG/DL (ref 0–2)
WBC # BLD AUTO: 8 10E9/L (ref 4–11)
WBC #/AREA URNS AUTO: <1 /HPF (ref 0–5)

## 2020-10-02 PROCEDURE — 250N000013 HC RX MED GY IP 250 OP 250 PS 637: Performed by: NURSE PRACTITIONER

## 2020-10-02 PROCEDURE — 99285 EMERGENCY DEPT VISIT HI MDM: CPT | Mod: 25 | Performed by: EMERGENCY MEDICINE

## 2020-10-02 PROCEDURE — 120N000002 HC R&B MED SURG/OB UMMC

## 2020-10-02 PROCEDURE — 71046 X-RAY EXAM CHEST 2 VIEWS: CPT | Mod: 26 | Performed by: STUDENT IN AN ORGANIZED HEALTH CARE EDUCATION/TRAINING PROGRAM

## 2020-10-02 PROCEDURE — 83690 ASSAY OF LIPASE: CPT | Performed by: EMERGENCY MEDICINE

## 2020-10-02 PROCEDURE — 93010 ELECTROCARDIOGRAM REPORT: CPT | Performed by: EMERGENCY MEDICINE

## 2020-10-02 PROCEDURE — 86850 RBC ANTIBODY SCREEN: CPT | Performed by: EMERGENCY MEDICINE

## 2020-10-02 PROCEDURE — 86901 BLOOD TYPING SEROLOGIC RH(D): CPT | Performed by: EMERGENCY MEDICINE

## 2020-10-02 PROCEDURE — 84484 ASSAY OF TROPONIN QUANT: CPT | Performed by: EMERGENCY MEDICINE

## 2020-10-02 PROCEDURE — 83690 ASSAY OF LIPASE: CPT | Performed by: STUDENT IN AN ORGANIZED HEALTH CARE EDUCATION/TRAINING PROGRAM

## 2020-10-02 PROCEDURE — 74178 CT ABD&PLV WO CNTR FLWD CNTR: CPT

## 2020-10-02 PROCEDURE — 258N000003 HC RX IP 258 OP 636: Performed by: NURSE PRACTITIONER

## 2020-10-02 PROCEDURE — 99207 PR APP CREDIT; MD BILLING SHARED VISIT: CPT | Performed by: NURSE PRACTITIONER

## 2020-10-02 PROCEDURE — 99223 1ST HOSP IP/OBS HIGH 75: CPT | Performed by: STUDENT IN AN ORGANIZED HEALTH CARE EDUCATION/TRAINING PROGRAM

## 2020-10-02 PROCEDURE — 81001 URINALYSIS AUTO W/SCOPE: CPT | Performed by: STUDENT IN AN ORGANIZED HEALTH CARE EDUCATION/TRAINING PROGRAM

## 2020-10-02 PROCEDURE — 85610 PROTHROMBIN TIME: CPT | Performed by: EMERGENCY MEDICINE

## 2020-10-02 PROCEDURE — 80320 DRUG SCREEN QUANTALCOHOLS: CPT | Performed by: EMERGENCY MEDICINE

## 2020-10-02 PROCEDURE — 86900 BLOOD TYPING SEROLOGIC ABO: CPT | Performed by: EMERGENCY MEDICINE

## 2020-10-02 PROCEDURE — 85025 COMPLETE CBC W/AUTO DIFF WBC: CPT | Performed by: EMERGENCY MEDICINE

## 2020-10-02 PROCEDURE — 80053 COMPREHEN METABOLIC PANEL: CPT | Performed by: EMERGENCY MEDICINE

## 2020-10-02 PROCEDURE — 74178 CT ABD&PLV WO CNTR FLWD CNTR: CPT | Mod: 26 | Performed by: STUDENT IN AN ORGANIZED HEALTH CARE EDUCATION/TRAINING PROGRAM

## 2020-10-02 PROCEDURE — 250N000011 HC RX IP 250 OP 636: Performed by: STUDENT IN AN ORGANIZED HEALTH CARE EDUCATION/TRAINING PROGRAM

## 2020-10-02 PROCEDURE — 71046 X-RAY EXAM CHEST 2 VIEWS: CPT

## 2020-10-02 PROCEDURE — U0003 INFECTIOUS AGENT DETECTION BY NUCLEIC ACID (DNA OR RNA); SEVERE ACUTE RESPIRATORY SYNDROME CORONAVIRUS 2 (SARS-COV-2) (CORONAVIRUS DISEASE [COVID-19]), AMPLIFIED PROBE TECHNIQUE, MAKING USE OF HIGH THROUGHPUT TECHNOLOGIES AS DESCRIBED BY CMS-2020-01-R: HCPCS | Performed by: EMERGENCY MEDICINE

## 2020-10-02 PROCEDURE — 93005 ELECTROCARDIOGRAM TRACING: CPT | Performed by: EMERGENCY MEDICINE

## 2020-10-02 PROCEDURE — C9803 HOPD COVID-19 SPEC COLLECT: HCPCS

## 2020-10-02 RX ORDER — ONDANSETRON 2 MG/ML
4 INJECTION INTRAMUSCULAR; INTRAVENOUS EVERY 6 HOURS PRN
Status: DISCONTINUED | OUTPATIENT
Start: 2020-10-02 | End: 2020-10-04 | Stop reason: HOSPADM

## 2020-10-02 RX ORDER — NALOXONE HYDROCHLORIDE 0.4 MG/ML
.1-.4 INJECTION, SOLUTION INTRAMUSCULAR; INTRAVENOUS; SUBCUTANEOUS
Status: DISCONTINUED | OUTPATIENT
Start: 2020-10-02 | End: 2020-10-04 | Stop reason: HOSPADM

## 2020-10-02 RX ORDER — PANTOPRAZOLE SODIUM 40 MG/1
40 TABLET, DELAYED RELEASE ORAL
Status: DISCONTINUED | OUTPATIENT
Start: 2020-10-03 | End: 2020-10-04 | Stop reason: HOSPADM

## 2020-10-02 RX ORDER — LANOLIN ALCOHOL/MO/W.PET/CERES
100 CREAM (GRAM) TOPICAL DAILY
COMMUNITY

## 2020-10-02 RX ORDER — ACETAMINOPHEN 500 MG
1000 TABLET ORAL EVERY 6 HOURS PRN
COMMUNITY

## 2020-10-02 RX ORDER — IOPAMIDOL 755 MG/ML
74 INJECTION, SOLUTION INTRAVASCULAR ONCE
Status: DISCONTINUED | OUTPATIENT
Start: 2020-10-02 | End: 2020-10-02

## 2020-10-02 RX ORDER — HYDROXYZINE PAMOATE 25 MG/1
25 CAPSULE ORAL 4 TIMES DAILY PRN
COMMUNITY

## 2020-10-02 RX ORDER — ONDANSETRON 4 MG/1
4 TABLET, ORALLY DISINTEGRATING ORAL EVERY 6 HOURS PRN
Status: DISCONTINUED | OUTPATIENT
Start: 2020-10-02 | End: 2020-10-04 | Stop reason: HOSPADM

## 2020-10-02 RX ORDER — ONDANSETRON 4 MG/1
4 TABLET, FILM COATED ORAL EVERY 6 HOURS PRN
COMMUNITY

## 2020-10-02 RX ORDER — FOLIC ACID 1 MG/1
1 TABLET ORAL DAILY
COMMUNITY

## 2020-10-02 RX ORDER — HYDROXYZINE HYDROCHLORIDE 25 MG/1
25 TABLET, FILM COATED ORAL 3 TIMES DAILY PRN
Status: DISCONTINUED | OUTPATIENT
Start: 2020-10-02 | End: 2020-10-04 | Stop reason: HOSPADM

## 2020-10-02 RX ORDER — IOPAMIDOL 755 MG/ML
100 INJECTION, SOLUTION INTRAVASCULAR ONCE
Status: COMPLETED | OUTPATIENT
Start: 2020-10-02 | End: 2020-10-02

## 2020-10-02 RX ORDER — MULTIPLE VITAMINS W/ MINERALS TAB 9MG-400MCG
1 TAB ORAL DAILY
Status: DISCONTINUED | OUTPATIENT
Start: 2020-10-03 | End: 2020-10-04 | Stop reason: HOSPADM

## 2020-10-02 RX ORDER — FOLIC ACID 1 MG/1
1 TABLET ORAL DAILY
Status: DISCONTINUED | OUTPATIENT
Start: 2020-10-03 | End: 2020-10-04 | Stop reason: HOSPADM

## 2020-10-02 RX ORDER — LIDOCAINE 40 MG/G
CREAM TOPICAL
Status: DISCONTINUED | OUTPATIENT
Start: 2020-10-02 | End: 2020-10-04 | Stop reason: HOSPADM

## 2020-10-02 RX ORDER — LANOLIN ALCOHOL/MO/W.PET/CERES
100 CREAM (GRAM) TOPICAL DAILY
Status: DISCONTINUED | OUTPATIENT
Start: 2020-10-03 | End: 2020-10-04 | Stop reason: HOSPADM

## 2020-10-02 RX ORDER — SODIUM CHLORIDE 9 MG/ML
INJECTION, SOLUTION INTRAVENOUS CONTINUOUS
Status: DISCONTINUED | OUTPATIENT
Start: 2020-10-02 | End: 2020-10-04 | Stop reason: HOSPADM

## 2020-10-02 RX ORDER — PANTOPRAZOLE SODIUM 40 MG/1
40 TABLET, DELAYED RELEASE ORAL
Status: DISCONTINUED | OUTPATIENT
Start: 2020-10-03 | End: 2020-10-02

## 2020-10-02 RX ORDER — ACETAMINOPHEN 325 MG/1
650 TABLET ORAL EVERY 4 HOURS PRN
Status: DISCONTINUED | OUTPATIENT
Start: 2020-10-02 | End: 2020-10-04 | Stop reason: HOSPADM

## 2020-10-02 RX ADMIN — HYDROXYZINE HYDROCHLORIDE 25 MG: 25 TABLET, FILM COATED ORAL at 22:34

## 2020-10-02 RX ADMIN — IOPAMIDOL 100 ML: 755 INJECTION, SOLUTION INTRAVENOUS at 20:53

## 2020-10-02 RX ADMIN — SODIUM CHLORIDE: 9 INJECTION, SOLUTION INTRAVENOUS at 22:21

## 2020-10-02 SDOH — HEALTH STABILITY: MENTAL HEALTH: HOW OFTEN DO YOU HAVE A DRINK CONTAINING ALCOHOL?: NOT ASKED

## 2020-10-02 SDOH — HEALTH STABILITY: MENTAL HEALTH: HOW OFTEN DO YOU HAVE 6 OR MORE DRINKS ON ONE OCCASION?: NOT ASKED

## 2020-10-02 SDOH — HEALTH STABILITY: MENTAL HEALTH: HOW MANY STANDARD DRINKS CONTAINING ALCOHOL DO YOU HAVE ON A TYPICAL DAY?: NOT ASKED

## 2020-10-02 SDOH — HEALTH STABILITY: MENTAL HEALTH: HOW MANY DRINKS CONTAINING ALCOHOL DO YOU HAVE ON A TYPICAL DAY WHEN YOU ARE DRINKING?: NOT ASKED

## 2020-10-02 SDOH — HEALTH STABILITY: MENTAL HEALTH: HOW OFTEN DO YOU HAVE SIX OR MORE DRINKS ON ONE OCCASION?: NOT ASKED

## 2020-10-02 ASSESSMENT — ENCOUNTER SYMPTOMS
FEVER: 0
ANAL BLEEDING: 0
ABDOMINAL PAIN: 1
BLOOD IN STOOL: 0
SHORTNESS OF BREATH: 0
DIARRHEA: 0
WEAKNESS: 0
VOMITING: 0
DIZZINESS: 0
NAUSEA: 0

## 2020-10-02 NOTE — ED PROVIDER NOTES
[place] EMERGENCY DEPARTMENT ([facility])  October 2, 2020  History     Chief Complaint   Patient presents with     CT Results     The history is provided by the patient and medical records.     Papa Ruiz is a 50 year old female with a known history of pancreatitis and alcohol abuse who was recently admitted to [facility] for pancreatitis and a GI bleed who presents to the ER as a referral from IR regarding a CT finding from last week on 9/21/2020.  Patient says that she was admitted to [facility] and was discharged last Wednesday.  The patient says that prior to discharge she had a CT abdomen/pelvis done.  Patient says that she got a voice message that she listened to today that said that she needed to come to the hospital for a procedure to be done.  The patient says that she has been feeling well for the past week since being discharged.  The patient says that she has been moving around with her 2 twin kids and has not been having any difficulty.  Patient denies any weakness or dizziness.  Denies any bleeding.  Patient says that she had a mild intermittent pain in the left upper region of her abdomen today that lasted a few minutes and has now resolved.  The patient says that she had no discomfort yesterday or the day before.  The patient says that she has not had any alcohol in the past 13 days.    This part of the medical record was transcribed by Latrell Mcdaniel, Medical Scribe, from a dictation done by Amelia Rutherford MD.     EXAM: CT ABDOMEN PANCREAS [facility]  9/22/2020 11:13 AM    INDICATION: Follow up suspected/known neoplasm.    COMPARISON: Abdominal ultrasound dated 09/22/2020.    IMPRESSION:     1.  Acute pancreatitis.    2. There are multiple fluid collections/cystic lesions throughout the pancreas.    Appearance favors sequelae of prior pancreatitis, such as walled off necrosis or    pseudocysts. However, a small cyst in the uncinate process communicates with  the main pancreatic duct, consistent with sidebranch intraductal papillary mucinous    neoplasm (IPMN). There is also an ovoid cystic structure in the pancreatic head    which may represent an additional collection/cyst versus aberrant pancreatic    duct. Recommend imaging surveillance with follow-up contrast enhanced MRI or CT in 6 months. Recommend gastroenterology consultation for consideration of    EUS/FNA.    3.  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    This is favored to arise from the splenic artery, although it is also closely    associated with splenic vein branches. Recommend interventional radiology    consultation.    4.  The splenic vein is patent. However, there is narrowing of the distal    splenic vein at the SMV. More proximally, there is an associated trace thin    filling defect, likely nonocclusive thrombus.    5.  3 mm pulmonary nodule in the right lung. Considerations for follow-up are    detailed below.        EXAM: US ABDOMEN LIMITED [facility] 9/22/2020 7:11 AM  COMPARISON: Abdominal ultrasounds, most recently 10/31/2018. CT of the abdomen and pelvis dated 07/12/2018.  IMPRESSION:  1.  Cystic lesion in the pancreatic tail measuring up to 1.9 cm in diameter.  Recommend further evaluation with contrast-enhanced pancreas protocol MRI or CT.  2.  Diffuse hepatic steatosis.          I have reviewed the Medications, Allergies, Past Medical and Surgical History, and Social History in the [employer] system.  PAST MEDICAL HISTORY: No past medical history on file.    PAST SURGICAL HISTORY:   Past Surgical History:   Procedure Laterality Date     GYN SURGERY         Past medical history, past surgical history, medications, and allergies were reviewed with the patient. Additional pertinent items: None    FAMILY HISTORY: No family history on file.    SOCIAL HISTORY:   Social History     Tobacco Use     Smoking status: Current Every Day Smoker     Last attempt to  quit: 1/22/2017     Years since quitting: 3.6     Smokeless tobacco: Never Used     Tobacco comment: 1-2 cigarettes a day   Substance Use Topics     Alcohol use: No     Alcohol/week: 0.0 standard drinks     Social history was reviewed with the patient. Additional pertinent items: None      Patient's Medications   New Prescriptions    No medications on file   Previous Medications    ASPIRIN 81 MG CHEWABLE TABLET    Take 81 mg by mouth daily    DIPHENHYDRAMINE HCL (BENADRYL PO)        ETHYL CHLORIDE EXTERNAL SPRAY    Apply three times per day as needed for itching.    HYDROXYZINE (VISTARIL) 25 MG CAPSULE    Take 1 capsule (25 mg) by mouth 3 times daily as needed for itching    IBUPROFEN (ADVIL PO)        IBUPROFEN-DIPHENHYDRAMINE CIT (ADVIL PM PO)        IVERMECTIN (STROMECTOL) 3 MG TABS TABLET        MULTIPLE VITAMINS-MINERALS (MULTIVITAMIN ADULT PO)        PERMETHRIN (ELIMITE) 5 % EXTERNAL CREAM    APPLY TO ENTIRE BODY FROM NECK TO FEET FOR 1 DOSE. WASH OFF AFTER 8 TO 14 HOURS    PREDNISONE (DELTASONE) 20 MG TABLET    Take 3 pills a day for 5 days and then 2 pills a day for 3 days and then 1 pill a day for 3 days and then 1/2 pill a day for 3 days.   Modified Medications    No medications on file   Discontinued Medications    No medications on file          Allergies   Allergen Reactions     Nkda [No Known Drug Allergies]         Review of Systems   Constitutional: Negative for fever.   Respiratory: Negative for shortness of breath.    Cardiovascular: Negative for chest pain.   Gastrointestinal: Positive for abdominal pain (LUQ; lasted a few minutes, now resolved). Negative for anal bleeding, blood in stool, diarrhea, nausea and vomiting.   Neurological: Negative for dizziness and weakness.   All other systems reviewed and are negative.      Physical Exam     ED Triage Vitals [10/02/20 7239]   Enc Vitals Group      BP (!) 142/90      Pulse 68      Resp 16      Temp 97.9  F (36.6  C)      Temp src Oral      SpO2 98 %      Physical Exam  Constitutional:       General: She is not in acute distress.     Appearance: Normal appearance. She is well-developed. She is not ill-appearing.   HENT:      Head: Normocephalic and atraumatic.   Neck:      Musculoskeletal: Normal range of motion.   Cardiovascular:      Rate and Rhythm: Normal rate and regular rhythm.      Heart sounds: Normal heart sounds.   Pulmonary:      Effort: Pulmonary effort is normal. No respiratory distress.      Breath sounds: Normal breath sounds.   Abdominal:      General: There is no distension.      Palpations: Abdomen is soft. There is no mass.      Tenderness: There is no abdominal tenderness. There is no rebound.      Hernia: No hernia is present.   Musculoskeletal:         General: No tenderness.   Skin:     General: Skin is warm and dry.   Neurological:      General: No focal deficit present.      Mental Status: She is alert and oriented to person, place, and time.      Cranial Nerves: No cranial nerve deficit.   Psychiatric:         Behavior: Behavior normal.         Thought Content: Thought content normal.         ED Course        Procedures                  EKG Interpretation:      Interpreted by Amelia Rutherford MD  Time reviewed: 2029  Symptoms at time of EKG: none   Rhythm: normal sinus   Rate: normal  Axis: normal  Ectopy: none  Conduction: normal  ST Segments/ T Waves: No ST-T wave changes  Q Waves: none  Comparison to prior: No old EKG available    Clinical Impression: normal EKG                    Results for orders placed or performed during the hospital encounter of 10/02/20   XR Chest 2 Views     Status: None    Narrative    EXAM: XR Chest 2 VW  10/2/2020 8:13 PM     HISTORY: Prior to admission    COMPARISON:  None.  CT 9/22/2020 was available for correlation.    FINDINGS: PA and lateral radiographs of the chest. The mediastinal  border, cardiac silhouette, and pulmonary vasculature are within  normal limits. No focal airspace opacities. No  pneumothorax. No  pleural effusions. Upper abdomen and osseous structures are within  normal limits. Calcification right upper quadrant correlates with a  coarse calcification seen in the liver on recent CT.      Impression    IMPRESSION: No focal airspace disease.    I have personally reviewed the examination and initial interpretation  and I agree with the findings.    MIREILLE KELLY MD   CBC with platelets differential     Status: Abnormal   Result Value Ref Range    WBC 8.0 4.0 - 11.0 10e9/L    RBC Count 3.05 (L) 3.8 - 5.2 10e12/L    Hemoglobin 10.2 (L) 11.7 - 15.7 g/dL    Hematocrit 32.6 (L) 35.0 - 47.0 %     (H) 78 - 100 fl    MCH 33.4 (H) 26.5 - 33.0 pg    MCHC 31.3 (L) 31.5 - 36.5 g/dL    RDW 14.7 10.0 - 15.0 %    Platelet Count 598 (H) 150 - 450 10e9/L    Diff Method Automated Method     % Neutrophils 48.0 %    % Lymphocytes 29.7 %    % Monocytes 8.6 %    % Eosinophils 11.0 %    % Basophils 2.4 %    % Immature Granulocytes 0.3 %    Nucleated RBCs 0 0 /100    Absolute Neutrophil 3.8 1.6 - 8.3 10e9/L    Absolute Lymphocytes 2.4 0.8 - 5.3 10e9/L    Absolute Monocytes 0.7 0.0 - 1.3 10e9/L    Absolute Eosinophils 0.9 (H) 0.0 - 0.7 10e9/L    Absolute Basophils 0.2 0.0 - 0.2 10e9/L    Abs Immature Granulocytes 0.0 0 - 0.4 10e9/L    Absolute Nucleated RBC 0.0    Comprehensive metabolic panel     Status: Abnormal   Result Value Ref Range    Sodium 138 133 - 144 mmol/L    Potassium 3.6 3.4 - 5.3 mmol/L    Chloride 105 94 - 109 mmol/L    Carbon Dioxide 29 20 - 32 mmol/L    Anion Gap 4 3 - 14 mmol/L    Glucose 92 70 - 99 mg/dL    Urea Nitrogen 8 7 - 30 mg/dL    Creatinine 0.51 (L) 0.52 - 1.04 mg/dL    GFR Estimate >90 >60 mL/min/[1.73_m2]    GFR Estimate If Black >90 >60 mL/min/[1.73_m2]    Calcium 9.3 8.5 - 10.1 mg/dL    Bilirubin Total 0.2 0.2 - 1.3 mg/dL    Albumin 3.8 3.4 - 5.0 g/dL    Protein Total 8.8 6.8 - 8.8 g/dL    Alkaline Phosphatase 110 40 - 150 U/L    ALT 96 (H) 0 - 50 U/L     (H) 0 - 45 U/L    INR     Status: None   Result Value Ref Range    INR 1.01 0.86 - 1.14   Lipase     Status: Abnormal   Result Value Ref Range    Lipase 1,734 (H) 73 - 393 U/L   Troponin I     Status: None   Result Value Ref Range    Troponin I ES <0.015 0.000 - 0.045 ug/L   Alcohol ethyl     Status: None   Result Value Ref Range    Ethanol g/dL <0.01 <0.01 g/dL   UA with Microscopic reflex to Culture     Status: Abnormal    Specimen: Urine Midstream; Midstream Urine   Result Value Ref Range    Color Urine Yellow     Appearance Urine Clear     Glucose Urine Negative NEG^Negative mg/dL    Bilirubin Urine Negative NEG^Negative    Ketones Urine Negative NEG^Negative mg/dL    Specific Gravity Urine 1.014 1.003 - 1.035    Blood Urine Negative NEG^Negative    pH Urine 6.5 5.0 - 7.0 pH    Protein Albumin Urine Negative NEG^Negative mg/dL    Urobilinogen mg/dL Normal 0.0 - 2.0 mg/dL    Nitrite Urine Negative NEG^Negative    Leukocyte Esterase Urine Negative NEG^Negative    Source Midstream Urine     WBC Urine <1 0 - 5 /HPF    RBC Urine <1 0 - 2 /HPF    Squamous Epithelial /HPF Urine 2 (H) 0 - 1 /HPF    Transitional Epi <1 0 - 1 /HPF   Lipase     Status: Abnormal   Result Value Ref Range    Lipase 1,320 (H) 73 - 393 U/L   EKG 12-lead, complete     Status: None   Result Value Ref Range    Interpretation ECG Click View Image link to view waveform and result    ABO/Rh type and screen     Status: None   Result Value Ref Range    ABO A     RH(D) Pos     Antibody Screen Neg     Test Valid Only At          [facility],    Specimen Expires 10/05/2020      Medications   pantoprazole (PROTONIX) EC tablet 40 mg (has no administration in time range)   hydrOXYzine (ATARAX) tablet 25 mg (has no administration in time range)   multivitamin w/minerals (THERA-VIT-M) tablet 1 tablet (has no administration in time range)   lidocaine 1 % 0.1-1 mL (has no administration in time range)   lidocaine (LMX4) cream (has no  administration in time range)   sodium chloride (PF) 0.9% PF flush 3 mL (has no administration in time range)   sodium chloride (PF) 0.9% PF flush 3 mL (has no administration in time range)   naloxone (NARCAN) injection 0.1-0.4 mg (has no administration in time range)   melatonin tablet 1 mg (has no administration in time range)   sodium chloride 0.9% infusion (has no administration in time range)   acetaminophen (TYLENOL) tablet 650 mg (has no administration in time range)   ondansetron (ZOFRAN-ODT) ODT tab 4 mg (has no administration in time range)     Or   ondansetron (ZOFRAN) injection 4 mg (has no administration in time range)   sodium chloride (PF) 0.9% PF flush 75 mL (75 mLs Intravenous Given 10/2/20 2054)   iopamidol (ISOVUE-370) solution 100 mL (100 mLs Intravenous Given 10/2/20 2053)            No results found for this or any previous visit (from the past 24 hour(s)).  Medications - No data to display          Assessments & Plan (with Medical Decision Making)   Patient is a nice 50-year-old female who presents to the ER as a follow-up from a CT that was done yesterday.  I discussed the case with IR resident on-call.  He said that he will discuss the case with his staff and get back to me with final recommendations.  At this time, we will check some basic labs and make sure patient's hemoglobin is stable.    Case was discussed with IR staff.  She says that she was trying to get the patient in to the hospital today so IR could perform the coil on the splenic artery.  They are unfortunately not going to be able to do the procedure today since it is now 8 PM.  They recommend that the patient be admitted to the hospital so they can repair her splenic aneurysm in the morning.  They said that the nature of the splenic aneurysm makes it a high risk for rupturing.  The case was discussed with IM triage.  Patient will be admitted to the hospital for monitoring and plan for IR intervention in the morning.    This  part of the medical record was transcribed by Latrell Mcdaniel, Medical Scribe, from a dictation done by Amelia Rutherford MD.       I have reviewed the nursing notes.    I have reviewed the findings, diagnosis, plan and need for follow up with the patient.    New Prescriptions    No medications on file       Final diagnoses:   Aneurysm of splenic artery (H)   Acute pancreatitis, unspecified complication status, unspecified pancreatitis type       10/2/2020   [facility] EMERGENCY DEPARTMENT     Amelia Rutherford MD  10/02/20 2636

## 2020-10-02 NOTE — TELEPHONE ENCOUNTER
I called and spoke with Papa, I have encouraged her to come to the ED at [facility] in regards to splenic pseudoaneurysm.  I have called report to the ED requesting they page IR on call upon arrival.  GEORGETTE Rangel RN, BSN  Interventional Radiology Nurse Coordinator   Phone:  [phone]

## 2020-10-02 NOTE — ED NOTES
IR is not planning to do anything emergent, unless there is a change in patient's condition and/or patient becomes hypotensive.  ED MD should have conversation with IR MD about further plan.

## 2020-10-02 NOTE — ED TRIAGE NOTES
Sent to ED for evaluation of splenic aneurysm found on CT scan on 9/21. Patient has no other complaints.

## 2020-10-03 ENCOUNTER — APPOINTMENT (OUTPATIENT)
Dept: INTERVENTIONAL RADIOLOGY/VASCULAR | Facility: CLINIC | Age: 50
DRG: 271 | End: 2020-10-03
Payer: COMMERCIAL

## 2020-10-03 LAB
ALBUMIN SERPL-MCNC: 2.9 G/DL (ref 3.4–5)
ALP SERPL-CCNC: 84 U/L (ref 40–150)
ALT SERPL W P-5'-P-CCNC: 81 U/L (ref 0–50)
ANION GAP SERPL CALCULATED.3IONS-SCNC: 4 MMOL/L (ref 3–14)
AST SERPL W P-5'-P-CCNC: 132 U/L (ref 0–45)
BILIRUB SERPL-MCNC: 0.2 MG/DL (ref 0.2–1.3)
BUN SERPL-MCNC: 7 MG/DL (ref 7–30)
CALCIUM SERPL-MCNC: 8.6 MG/DL (ref 8.5–10.1)
CHLORIDE SERPL-SCNC: 110 MMOL/L (ref 94–109)
CO2 SERPL-SCNC: 26 MMOL/L (ref 20–32)
CREAT SERPL-MCNC: 0.55 MG/DL (ref 0.52–1.04)
ERYTHROCYTE [DISTWIDTH] IN BLOOD BY AUTOMATED COUNT: 14.6 % (ref 10–15)
GFR SERPL CREATININE-BSD FRML MDRD: >90 ML/MIN/{1.73_M2}
GLUCOSE SERPL-MCNC: 96 MG/DL (ref 70–99)
HCT VFR BLD AUTO: 27.6 % (ref 35–47)
HGB BLD-MCNC: 8.6 G/DL (ref 11.7–15.7)
MCH RBC QN AUTO: 33.3 PG (ref 26.5–33)
MCHC RBC AUTO-ENTMCNC: 31.2 G/DL (ref 31.5–36.5)
MCV RBC AUTO: 107 FL (ref 78–100)
PLATELET # BLD AUTO: 423 10E9/L (ref 150–450)
POTASSIUM SERPL-SCNC: 4.4 MMOL/L (ref 3.4–5.3)
PROT SERPL-MCNC: 6.7 G/DL (ref 6.8–8.8)
RADIOLOGIST FLAGS: NORMAL
RBC # BLD AUTO: 2.58 10E12/L (ref 3.8–5.2)
SODIUM SERPL-SCNC: 140 MMOL/L (ref 133–144)
WBC # BLD AUTO: 5.8 10E9/L (ref 4–11)

## 2020-10-03 PROCEDURE — C1769 GUIDE WIRE: HCPCS

## 2020-10-03 PROCEDURE — 250N000013 HC RX MED GY IP 250 OP 250 PS 637: Performed by: NURSE PRACTITIONER

## 2020-10-03 PROCEDURE — 36247 INS CATH ABD/L-EXT ART 3RD: CPT

## 2020-10-03 PROCEDURE — 272N000679 HC CATH NEURO CR25

## 2020-10-03 PROCEDURE — 76937 US GUIDE VASCULAR ACCESS: CPT | Mod: 26 | Performed by: RADIOLOGY

## 2020-10-03 PROCEDURE — 99222 1ST HOSP IP/OBS MODERATE 55: CPT | Mod: GC | Performed by: INTERNAL MEDICINE

## 2020-10-03 PROCEDURE — 272N000641 HC COIL/EMBOLIC DEVICE CR24

## 2020-10-03 PROCEDURE — 272N000188 HC ACCESSORY CR12

## 2020-10-03 PROCEDURE — 76380 CAT SCAN FOLLOW-UP STUDY: CPT | Mod: 26 | Performed by: RADIOLOGY

## 2020-10-03 PROCEDURE — 250N000009 HC RX 250: Performed by: STUDENT IN AN ORGANIZED HEALTH CARE EDUCATION/TRAINING PROGRAM

## 2020-10-03 PROCEDURE — C1887 CATHETER, GUIDING: HCPCS

## 2020-10-03 PROCEDURE — 99152 MOD SED SAME PHYS/QHP 5/>YRS: CPT | Mod: GC | Performed by: RADIOLOGY

## 2020-10-03 PROCEDURE — 80053 COMPREHEN METABOLIC PANEL: CPT | Performed by: NURSE PRACTITIONER

## 2020-10-03 PROCEDURE — 37242 VASC EMBOLIZE/OCCLUDE ARTERY: CPT | Mod: GC | Performed by: RADIOLOGY

## 2020-10-03 PROCEDURE — 75726 ARTERY X-RAYS ABDOMEN: CPT

## 2020-10-03 PROCEDURE — 99153 MOD SED SAME PHYS/QHP EA: CPT

## 2020-10-03 PROCEDURE — C1760 CLOSURE DEV, VASC: HCPCS

## 2020-10-03 PROCEDURE — 272N000638 HC COIL/EMBOLIC DEVICE CR21

## 2020-10-03 PROCEDURE — 258N000003 HC RX IP 258 OP 636: Performed by: STUDENT IN AN ORGANIZED HEALTH CARE EDUCATION/TRAINING PROGRAM

## 2020-10-03 PROCEDURE — 272N000639 HC COIL/EMBOLIC DEVICE CR22

## 2020-10-03 PROCEDURE — 258N000003 HC RX IP 258 OP 636: Performed by: NURSE PRACTITIONER

## 2020-10-03 PROCEDURE — 255N000002 HC RX 255 OP 636: Performed by: INTERNAL MEDICINE

## 2020-10-03 PROCEDURE — 36415 COLL VENOUS BLD VENIPUNCTURE: CPT | Performed by: NURSE PRACTITIONER

## 2020-10-03 PROCEDURE — 37242 VASC EMBOLIZE/OCCLUDE ARTERY: CPT

## 2020-10-03 PROCEDURE — 82656 EL-1 FECAL QUAL/SEMIQ: CPT | Performed by: STUDENT IN AN ORGANIZED HEALTH CARE EDUCATION/TRAINING PROGRAM

## 2020-10-03 PROCEDURE — 250N000011 HC RX IP 250 OP 636: Performed by: STUDENT IN AN ORGANIZED HEALTH CARE EDUCATION/TRAINING PROGRAM

## 2020-10-03 PROCEDURE — 272N000566 HC SHEATH CR3

## 2020-10-03 PROCEDURE — 272N000504 HC NEEDLE CR4

## 2020-10-03 PROCEDURE — 250N000011 HC RX IP 250 OP 636: Performed by: RADIOLOGY

## 2020-10-03 PROCEDURE — 250N000011 HC RX IP 250 OP 636: Performed by: NURSE PRACTITIONER

## 2020-10-03 PROCEDURE — 272N000143 HC KIT CR3

## 2020-10-03 PROCEDURE — 37243 VASC EMBOLIZE/OCCLUDE ORGAN: CPT

## 2020-10-03 PROCEDURE — 85027 COMPLETE CBC AUTOMATED: CPT | Performed by: NURSE PRACTITIONER

## 2020-10-03 PROCEDURE — 272N000199 HC ACCESSORY CR8

## 2020-10-03 PROCEDURE — 36247 INS CATH ABD/L-EXT ART 3RD: CPT | Mod: GC | Performed by: RADIOLOGY

## 2020-10-03 PROCEDURE — 99152 MOD SED SAME PHYS/QHP 5/>YRS: CPT

## 2020-10-03 PROCEDURE — 75726 ARTERY X-RAYS ABDOMEN: CPT | Mod: 26 | Performed by: RADIOLOGY

## 2020-10-03 PROCEDURE — 99232 SBSQ HOSP IP/OBS MODERATE 35: CPT | Performed by: INTERNAL MEDICINE

## 2020-10-03 PROCEDURE — 120N000002 HC R&B MED SURG/OB UMMC

## 2020-10-03 RX ORDER — DIPHENHYDRAMINE HYDROCHLORIDE 50 MG/ML
25 INJECTION INTRAMUSCULAR; INTRAVENOUS ONCE
Status: COMPLETED | OUTPATIENT
Start: 2020-10-03 | End: 2020-10-03

## 2020-10-03 RX ORDER — CEFAZOLIN SODIUM 2 G/100ML
2 INJECTION, SOLUTION INTRAVENOUS ONCE
Status: COMPLETED | OUTPATIENT
Start: 2020-10-03 | End: 2020-10-03

## 2020-10-03 RX ORDER — LIDOCAINE 40 MG/G
CREAM TOPICAL
Status: DISCONTINUED | OUTPATIENT
Start: 2020-10-03 | End: 2020-10-03 | Stop reason: HOSPADM

## 2020-10-03 RX ORDER — DEXTROSE MONOHYDRATE 25 G/50ML
25-50 INJECTION, SOLUTION INTRAVENOUS
Status: DISCONTINUED | OUTPATIENT
Start: 2020-10-03 | End: 2020-10-04 | Stop reason: HOSPADM

## 2020-10-03 RX ORDER — DOCUSATE SODIUM 100 MG/1
100 CAPSULE, LIQUID FILLED ORAL 2 TIMES DAILY
Status: DISCONTINUED | OUTPATIENT
Start: 2020-10-03 | End: 2020-10-04 | Stop reason: HOSPADM

## 2020-10-03 RX ORDER — NICOTINE POLACRILEX 4 MG
15-30 LOZENGE BUCCAL
Status: DISCONTINUED | OUTPATIENT
Start: 2020-10-03 | End: 2020-10-04 | Stop reason: HOSPADM

## 2020-10-03 RX ORDER — FLUMAZENIL 0.1 MG/ML
0.2 INJECTION, SOLUTION INTRAVENOUS
Status: DISCONTINUED | OUTPATIENT
Start: 2020-10-03 | End: 2020-10-03 | Stop reason: HOSPADM

## 2020-10-03 RX ORDER — FENTANYL CITRATE 50 UG/ML
25-50 INJECTION, SOLUTION INTRAMUSCULAR; INTRAVENOUS EVERY 5 MIN PRN
Status: DISCONTINUED | OUTPATIENT
Start: 2020-10-03 | End: 2020-10-03 | Stop reason: HOSPADM

## 2020-10-03 RX ORDER — SODIUM CHLORIDE 9 MG/ML
INJECTION, SOLUTION INTRAVENOUS CONTINUOUS
Status: DISCONTINUED | OUTPATIENT
Start: 2020-10-03 | End: 2020-10-04 | Stop reason: HOSPADM

## 2020-10-03 RX ORDER — HEPARIN SODIUM 200 [USP'U]/100ML
2 INJECTION, SOLUTION INTRAVENOUS CONTINUOUS PRN
Status: DISCONTINUED | OUTPATIENT
Start: 2020-10-03 | End: 2020-10-03 | Stop reason: HOSPADM

## 2020-10-03 RX ORDER — DEXTROSE MONOHYDRATE 25 G/50ML
25-50 INJECTION, SOLUTION INTRAVENOUS
Status: DISCONTINUED | OUTPATIENT
Start: 2020-10-03 | End: 2020-10-03 | Stop reason: HOSPADM

## 2020-10-03 RX ORDER — NICOTINE POLACRILEX 4 MG
15-30 LOZENGE BUCCAL
Status: DISCONTINUED | OUTPATIENT
Start: 2020-10-03 | End: 2020-10-03 | Stop reason: HOSPADM

## 2020-10-03 RX ORDER — IODIXANOL 320 MG/ML
150 INJECTION, SOLUTION INTRAVASCULAR ONCE
Status: COMPLETED | OUTPATIENT
Start: 2020-10-03 | End: 2020-10-03

## 2020-10-03 RX ORDER — NALOXONE HYDROCHLORIDE 0.4 MG/ML
.1-.4 INJECTION, SOLUTION INTRAMUSCULAR; INTRAVENOUS; SUBCUTANEOUS
Status: DISCONTINUED | OUTPATIENT
Start: 2020-10-03 | End: 2020-10-03 | Stop reason: HOSPADM

## 2020-10-03 RX ADMIN — DIPHENHYDRAMINE HYDROCHLORIDE 25 MG: 50 INJECTION, SOLUTION INTRAMUSCULAR; INTRAVENOUS at 16:27

## 2020-10-03 RX ADMIN — ONDANSETRON 4 MG: 2 INJECTION INTRAMUSCULAR; INTRAVENOUS at 16:09

## 2020-10-03 RX ADMIN — IODIXANOL 175 ML: 320 INJECTION, SOLUTION INTRAVASCULAR at 17:55

## 2020-10-03 RX ADMIN — PANTOPRAZOLE SODIUM 40 MG: 40 TABLET, DELAYED RELEASE ORAL at 09:00

## 2020-10-03 RX ADMIN — HYDROXYZINE HYDROCHLORIDE 25 MG: 25 TABLET, FILM COATED ORAL at 18:52

## 2020-10-03 RX ADMIN — MIDAZOLAM HYDROCHLORIDE 1 MG: 1 INJECTION, SOLUTION INTRAMUSCULAR; INTRAVENOUS at 16:31

## 2020-10-03 RX ADMIN — FENTANYL CITRATE 50 MCG: 50 INJECTION, SOLUTION INTRAMUSCULAR; INTRAVENOUS at 17:18

## 2020-10-03 RX ADMIN — FENTANYL CITRATE 50 MCG: 50 INJECTION, SOLUTION INTRAMUSCULAR; INTRAVENOUS at 13:58

## 2020-10-03 RX ADMIN — MULTIPLE VITAMINS W/ MINERALS TAB 1 TABLET: TAB at 09:00

## 2020-10-03 RX ADMIN — SODIUM CHLORIDE: 9 INJECTION, SOLUTION INTRAVENOUS at 19:21

## 2020-10-03 RX ADMIN — FENTANYL CITRATE 50 MCG: 50 INJECTION, SOLUTION INTRAMUSCULAR; INTRAVENOUS at 15:44

## 2020-10-03 RX ADMIN — DIPHENHYDRAMINE HYDROCHLORIDE 25 MG: 50 INJECTION, SOLUTION INTRAMUSCULAR; INTRAVENOUS at 14:50

## 2020-10-03 RX ADMIN — ACETAMINOPHEN 650 MG: 325 TABLET, FILM COATED ORAL at 09:00

## 2020-10-03 RX ADMIN — MIDAZOLAM HYDROCHLORIDE 1 MG: 1 INJECTION, SOLUTION INTRAMUSCULAR; INTRAVENOUS at 14:54

## 2020-10-03 RX ADMIN — DIPHENHYDRAMINE HYDROCHLORIDE 25 MG: 50 INJECTION, SOLUTION INTRAMUSCULAR; INTRAVENOUS at 14:27

## 2020-10-03 RX ADMIN — MIDAZOLAM HYDROCHLORIDE 1 MG: 1 INJECTION, SOLUTION INTRAMUSCULAR; INTRAVENOUS at 14:29

## 2020-10-03 RX ADMIN — ONDANSETRON 4 MG: 2 INJECTION INTRAMUSCULAR; INTRAVENOUS at 10:21

## 2020-10-03 RX ADMIN — PANTOPRAZOLE SODIUM 40 MG: 40 TABLET, DELAYED RELEASE ORAL at 18:52

## 2020-10-03 RX ADMIN — ACETAMINOPHEN 650 MG: 325 TABLET, FILM COATED ORAL at 00:12

## 2020-10-03 RX ADMIN — FENTANYL CITRATE 50 MCG: 50 INJECTION, SOLUTION INTRAMUSCULAR; INTRAVENOUS at 15:09

## 2020-10-03 RX ADMIN — SODIUM CHLORIDE: 9 INJECTION, SOLUTION INTRAVENOUS at 09:01

## 2020-10-03 RX ADMIN — MIDAZOLAM HYDROCHLORIDE 1 MG: 1 INJECTION, SOLUTION INTRAMUSCULAR; INTRAVENOUS at 13:57

## 2020-10-03 RX ADMIN — FENTANYL CITRATE 50 MCG: 50 INJECTION, SOLUTION INTRAMUSCULAR; INTRAVENOUS at 14:30

## 2020-10-03 RX ADMIN — THIAMINE HCL TAB 100 MG 100 MG: 100 TAB at 09:00

## 2020-10-03 RX ADMIN — LIDOCAINE HYDROCHLORIDE 6 ML: 10 INJECTION, SOLUTION INFILTRATION; PERINEURAL at 17:10

## 2020-10-03 RX ADMIN — ACETAMINOPHEN 650 MG: 325 TABLET, FILM COATED ORAL at 18:52

## 2020-10-03 RX ADMIN — MIDAZOLAM HYDROCHLORIDE 1 MG: 1 INJECTION, SOLUTION INTRAMUSCULAR; INTRAVENOUS at 15:29

## 2020-10-03 RX ADMIN — FOLIC ACID 1 MG: 1 TABLET ORAL at 09:00

## 2020-10-03 RX ADMIN — CEFAZOLIN SODIUM 2 G: 2 INJECTION, SOLUTION INTRAVENOUS at 14:21

## 2020-10-03 RX ADMIN — DIPHENHYDRAMINE HYDROCHLORIDE 25 MG: 50 INJECTION, SOLUTION INTRAMUSCULAR; INTRAVENOUS at 16:22

## 2020-10-03 RX ADMIN — FENTANYL CITRATE 50 MCG: 50 INJECTION, SOLUTION INTRAMUSCULAR; INTRAVENOUS at 16:35

## 2020-10-03 RX ADMIN — Medication 1 MG: at 00:12

## 2020-10-03 RX ADMIN — HEPARIN SODIUM 3 BAG: 200 INJECTION, SOLUTION INTRAVENOUS at 17:08

## 2020-10-03 ASSESSMENT — ACTIVITIES OF DAILY LIVING (ADL)
ADLS_ACUITY_SCORE: 15

## 2020-10-03 NOTE — PROCEDURES
[facility]     Procedure: IR Procedure Note    Date/Time: 10/3/2020 5:57 PM  Performed by: Miley Leyva MD  Authorized by: Miley Leyva MD   IR Fellow Physician: CAT Leyva MD.   Other(s) attending procedure: DIANN Ashley MD.     UNIVERSAL PROTOCOL   Site Marked: No  Prior Images Obtained and Reviewed:  Yes  Required items: Required blood products, implants, devices and special equipment available    Patient identity confirmed:  Verbally with patient, arm band, provided demographic data and hospital-assigned identification number  Patient was reevaluated immediately before administering moderate or deep sedation or anesthesia  Confirmation Checklist:  Patient's identity using two indicators, relevant allergies, procedure was appropriate and matched the consent or emergent situation and correct equipment/implants were available  Time out: Immediately prior to the procedure a time out was called    Universal Protocol: the Joint Commission Universal Protocol was followed    Preparation: Patient was prepped and draped in usual sterile fashion           ANESTHESIA    Anesthesia: Local infiltration  Local Anesthetic:  Lidocaine 1% without epinephrine      SEDATION    Patient Sedated: Yes    Sedation Type:  Moderate (conscious) sedation  Vital signs: Vital signs monitored during sedation    See dictated procedure note for full details.  Findings: -Left splenic artery pseudoaneurysm     Specimens: none    Complications: None    Condition: Stable    Plan: -Bedrest x 6 hr      PROCEDURE   Patient Tolerance:  Patient tolerated the procedure well with no immediate complications  Describe Procedure: -coil embolization of splenic artery aneurysm.   Length of time physician/provider present for 1:1 monitoring during sedation: 210

## 2020-10-03 NOTE — PROGRESS NOTES
Patient Name: Papa Ruiz  Medical Record Number: [id number]  Today's Date: 10/3/2020    Procedure: Angiogram of splenic artery pseudoaneurysm versus pseudoaneurysm in the left upper quadrant with intervention  Proceduralist: CAT Leyva MD with DIANN Ashley MD    Sedation Start: 1357  Procedure Start: 1427  Procedure end: 1720  Sedation medications administered: Benadryl 100 mg; versed 5 mg; fentanyl 250 mcg     Report given to:  KAYLIE Mendez  : no  needed    Other Notes: Pt arrived to IR room 1 from . Consent obtained. Pt denies any questions or concerns regarding procedure. Pt positioned supine and monitored per protocol. Pt tolerated procedure without any noted complications. Benadryl was given due to pt c/o itching pre and intraprocedure; no hives noted. Coiling done to splenic artery.  Right femoral puncture site closed with 5 Fr Mynx, LOT [id number], exp 2022-07-31; site is clean and dry with tegaderm covering, but oozing after 5 minutes pressure so pressure held 10 more minutes then site intact with US check by MD and RTR is good; no hematoma noted. CMS RLE normal, same as preprocedure; see flowsheets. PT NEEDS TO REMAIN ON BEDREST FOR 6 HOURS, RN ON  AWARE, AS IS PT. Pt transferred back to  with bedside handoff femoral site check with RN.  Pt will need her Atarax ASAP upstairs, benadryl helping some but needs more coverage.

## 2020-10-03 NOTE — PLAN OF CARE
Pt admitted to  with  hx pancreatitis, smoking, ETOH use. Pt had flare up of pancreatitis symptoms and went in of scan and it showed lesion on the spleen artery. Pt told go to ED for admission and possible IR procedure in am of 10/3/20. Admitted to  at 2130. Vitals stable, RA 97%. LS clear. Skin intact. +BS. Voiding good amounts. Up independently in room. Reg diet until midnight, then NPO for IR procedure in am. MIV NS @ 100.  Denies pain at this time. Pt anxious about procedure. PRN medications  given for anxiety given. Pt care team to discuss procedure with patient in am prior to IR. Will continue to monitor pt needs.

## 2020-10-03 NOTE — PROGRESS NOTES
[facility]     Medicine Progress Note - Hospitalist Service, Gold 10       Date of Admission:  10/2/2020  Assessment & Plan       Joseph is a 50 year old female admitted on 10/2/2020. She was admitted to [facility] for pancreatitis and GI bleed. She was discharged 9/30/20. During admit, she was found to have a  splenic artery aneurysm, plan for referral to IR for coiling of aneurysm as outpatient. She received a call today to tell her to proceed to the hospital for coiling due to the high risk of rupture and bleeding to death.       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  If unable, will be able to have regular diet, and NPO after MN  -  ml/hr while NPO for now     # intraductal papillary mucinous neoplasm (IPMN) vs chronic pancreatitis   Seen on imaging at [facility]  GI consulted, unlikely ca, and instead is likely related to pseudocysts or necrosis from prior episodes of pancreatis.  Follow-up as planned by gastroenterology team at [facility], no need for GI specific follow-up with us at this time     # Anemia  Anemia possibly from ETOH abuse vs recent GI bleed  - trend CBC  - continue PTA protonix     # History of alcohol abuse  She states that she has not consumed alcohol for 12 days and is interested in quitting  - continue thiamine, MVI, and folic acid.  Will discuss oral naltrexone, gabapentin, or baclofen prior to discharge.     #  Tobacco dependence               She is only smoking a few cigarrettes a day. History of smoking 3/4- 1 PPD for 25 years.      Diet:  NPO   DVT Prophylaxis: Pneumatic Compression Devices  Nguyen Catheter: not present  Code Status:   full code           Disposition Plan     Expected discharge: 2 - 3 days, recommended to prior living arrangement once adequate pain management/ tolerating PO  medications.       Diet: NPO per Anesthesia Guidelines for Procedure/Surgery Except for: Meds    DVT Prophylaxis: VTE Prophylaxis contraindicated due to procedure  Nguyen Catheter: not present  Code Status: Full Code           Disposition Plan   Expected discharge: 1-2 days, recommended to prior living arrangement once procedure completed and risk of bleeding mitigated.  Entered: Vince Rashid MD 10/03/2020, 12:26 PM       The patient's care was discussed with the Bedside Nurse, Patient and IR Consultant.    Vince Rasihd MD  Hospitalist Service, [address]   Contact information available via [facility] Paging/Directory  Please see sign in/sign out for up to date coverage information  ______________________________________________________________________    Interval History   NO acute events.  Anxious about the procedure.  No new fevers, no new abdominal pain, no lightheadness, ROS otherwise negative    Data reviewed today: I reviewed all medications, new labs and imaging results over the last 24 hours. I personally reviewed imaging.    Physical Exam   Vital Signs: Temp: 97.8  F (36.6  C) Temp src: Oral BP: 128/79 Pulse: 69   Resp: 16 SpO2: 100 % O2 Device: None (Room air)    Weight: 122 lbs 4.8 oz  Gen: NAD  HEENT: EOMI, PERRL, MMM   CV: extremities warm and well perfused  Resp: breathing comfortably on RA  Abd: soft, nontender  : deferred  Msk: no LE edema  Skin: no rashes  Neuro: nonfocal exam

## 2020-10-03 NOTE — PRE-PROCEDURE
GENERAL PRE-PROCEDURE:   Procedure:  Splenic vascular pseudoaneurysm    Verbal consent obtained?: Yes    Written consent obtained?: Yes    Risks and benefits: Risks, benefits and alternatives were discussed    Consent given by:  Patient  Patient states understanding of procedure being performed: Yes    Patient's understanding of procedure matches consent: Yes    Procedure consent matches procedure scheduled: Yes    Expected level of sedation:  Moderate  Appropriately NPO:  Yes  ASA Class:  Class 2- mild systemic disease, no acute problems, no functional limitations  Mallampati  :  Grade 2- soft palate, base of uvula, tonsillar pillars, and portion of posterior pharyngeal wall visible  Lungs:  Lungs clear with good breath sounds bilaterally  Heart:  Normal heart sounds and rate  History & Physical reviewed:  History and physical reviewed and no updates needed  Statement of review:  I have reviewed the lab findings, diagnostic data, medications, and the plan for sedation

## 2020-10-03 NOTE — CONSULTS
GASTROENTEROLOGY CONSULTATION      Date of Admission:  10/2/2020           Reason for Consultation:   We were asked by Dr. Smallwood of medicine to evaluate this patient with pancreatitis with associated fluid collections         ASSESSMENT AND RECOMMENDATIONS:   Assessment:  50 year old female with a history of alcohol abuse, prior episodes of alcohol induced pancreatitis, tobacco abuse, with recent admission to [facility] for alcohol induced pancreatitis, upper GI bleed, and new suspected pseudoaneurysm who GI was consulted on for pancreatitis and pancreatic cyst.  Suspect pancreatic lesions are related to pseudocysts or necrosis from prior episodes of pancreatitis, and unlikely to be related to pancreatic cystic neoplasms such as IPMN.  She is symptomatically significantly improved since her last admission, and at this time denies any abdominal pain, nausea, vomiting, fevers/chills, GI bleeding.  The collections do not appear large enough drain, are improving in size, and given she is asymptomatic would not recommend further intervention.  At this time, will defer to IR for management for suspected pseudoaneurysm.     Recommendations  -Appreciate IR consult  -Complete alcohol cessation  -Diet as tolerated post procedures from GI standpoint  -Follow-up as planned by gastroenterology team at [facility], no need for GI specific follow-up with us at this time.    GI will sign off    Thank you for involving us in this patient's care. Please do not hesitate to contact the GI service with any questions or concerns.     Pt care plan discussed with Dr. Torres, GI staff physician.    Darron Mckeon MD  GI Fellow  P: [phone]  -------------------------------------------------------------------------------------------------------------------    History of Present Illness   Papa Ruiz is a 50 year old female with a history of alcohol abuse, alcohol induced pancreatitis, smoking who GI was consulted on for pancreatitis  "and pancreatic cyst.    She was recently admitted from 9/19/2020 to 9/23/2020 at [facility] for alcohol induced pancreatitis and upper GI bleeding.  She underwent EGD which revealed grade C esophagitis, gastric erosion, duodenitis.Regarding pancreatitis, CT on 9/22/2020 revealed \"multiple fluid collections/cystic lesions throughout the pancreas. Appearance favors sequelae of prior pancreatitis, such as walled off necrosis or pseudocysts. However, a small cyst in the uncinate process communicates with the main pancreatic duct, consistent with sidebranch intraductal papillary mucinous neoplasm (IPMN).\" and \"Pseudoaneurysm associated with a fluid collection in the pancreatic tail. This is favored to arise from the splenic artery, although it is also closely associated with splenic vein branches. 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    She was referred to interventional radiology as an outpatient, it appears they reviewed imaging and recommended presentation to the ED.    She reports that since discharge, she has been feeling symptomatically much better.  She denies abdominal pain, nausea, vomiting, fevers, chills, black or bloody stools.  She denies any alcohol use since last admission.  She reports that she was drinking \"a lot\" prior to her hospital stay at [facility].  She reports she has prior episodes of pancreatitis in 2018.    On arrival, she was vitally stable.  Hemoglobin 10.2, elevated lipase, mildly elevated transaminases (AST>ALT). Repeat CT done here with evidence of increased size of focal vascular collection near pancreatic tail, pancreatic cystic collections appear smaller.    She had CT in 2018 without evidence of pancreatic fluid collections.  She  reportedly was hospitalized with pancreatitis at that time.    Past Medical History    Reviewed and edited as appropriate  Past Medical History:   Diagnosis Date     Alcohol-induced acute " pancreatitis      Peptic ulcer        Past Surgical History   Reviewed and edited as appropriate   Past Surgical History:   Procedure Laterality Date     GYN SURGERY         Social History   Reviewed and edited as appropriate  Social History     Socioeconomic History     Marital status: Single     Spouse name: Not on file     Number of children: Not on file     Years of education: Not on file     Highest education level: Not on file   Occupational History     Not on file   Social Needs     Financial resource strain: Not on file     Food insecurity     Worry: Not on file     Inability: Not on file     Transportation needs     Medical: Not on file     Non-medical: Not on file   Tobacco Use     Smoking status: Current Every Day Smoker     Last attempt to quit: 1/22/2017     Years since quitting: 3.6     Smokeless tobacco: Never Used     Tobacco comment: 1-2 cigarettes a day   Substance and Sexual Activity     Alcohol use: No     Comment: quit 12 days ago     Drug use: No     Sexual activity: Not on file   Lifestyle     Physical activity     Days per week: Not on file     Minutes per session: Not on file     Stress: Not on file   Relationships     Social connections     Talks on phone: Not on file     Gets together: Not on file     Attends Taoist service: Not on file     Active member of club or organization: Not on file     Attends meetings of clubs or organizations: Not on file     Relationship status: Not on file     Intimate partner violence     Fear of current or ex partner: Not on file     Emotionally abused: Not on file     Physically abused: Not on file     Forced sexual activity: Not on file   Other Topics Concern     Parent/sibling w/ CABG, MI or angioplasty before 65F 55M? Not Asked   Social History Narrative     Not on file       Family History     Reviewed and edited as appropriate  Family History   Problem Relation Age of Onset     No Known Problems Mother      No Known Problems Father      Allergies  "  Reviewed and edited as appropriate     Allergies   Allergen Reactions     Nkda [No Known Drug Allergies]         Prior to Admission Medications    Current Facility-Administered Medications   Medication     acetaminophen (TYLENOL) tablet 650 mg     folic acid (FOLVITE) tablet 1 mg     hydrOXYzine (ATARAX) tablet 25 mg     lidocaine (LMX4) cream     lidocaine 1 % 0.1-1 mL     melatonin tablet 1 mg     multivitamin w/minerals (THERA-VIT-M) tablet 1 tablet     naloxone (NARCAN) injection 0.1-0.4 mg     ondansetron (ZOFRAN-ODT) ODT tab 4 mg    Or     ondansetron (ZOFRAN) injection 4 mg     pantoprazole (PROTONIX) EC tablet 40 mg     sodium chloride (PF) 0.9% PF flush 3 mL     sodium chloride (PF) 0.9% PF flush 3 mL     sodium chloride 0.9% infusion     thiamine (B-1) tablet 100 mg      Medications Prior to Admission   Medication Sig Dispense Refill Last Dose     acetaminophen (TYLENOL) 500 MG tablet Take 1,000 mg by mouth every 6 hours as needed for pain   10/2/2020     folic acid (FOLVITE) 1 MG tablet Take 1 mg by mouth daily   10/2/2020     hydrOXYzine (VISTARIL) 25 MG capsule Take 25 mg by mouth 4 times daily as needed for itching   10/2/2020     Multiple Vitamins-Minerals (MULTIVITAMIN ADULT PO) Take 1 tablet by mouth daily    10/2/2020     ondansetron (ZOFRAN) 4 MG tablet Take 4 mg by mouth every 6 hours as needed for nausea   10/2/2020     thiamine (B-1) 100 MG tablet Take 100 mg by mouth daily   10/2/2020        Review of Systems   A complete review of systems was performed and is negative except as noted in the HPI      Physical Exam   /70 (BP Location: Left arm)   Pulse 89   Temp 97  F (36.1  C) (Oral)   Resp 18   Ht 1.689 m (5' 6.5\")   Wt 55.5 kg (122 lb 4.8 oz)   LMP 09/13/2016   SpO2 99%   BMI 19.44 kg/m    Wt:   Wt Readings from Last 2 Encounters:   10/02/20 55.5 kg (122 lb 4.8 oz)   01/24/20 55.2 kg (121 lb 9.6 oz)      Constitutional: cooperative, pleasant, not dyspneic/diaphoretic, no " acute distress  Eyes: Sclera anicteric/injected  Ears/nose/mouth/throat: mucus membranes moist  Neck: supple  CV: No edema  Respiratory: Unlabored breathing  Abd: Nondistended, +bs, nontender, no peritoneal signs  Skin: warm, perfused, no jaundice  Neuro: AAO x 3  Psych: Normal affect  MSK: No gross deformities    Data   Labs and imaging below were independently reviewed and interpreted    BMP  Recent Labs   Lab 10/02/20  1823      POTASSIUM 3.6   CHLORIDE 105   FELICIA 9.3   CO2 29   BUN 8   CR 0.51*   GLC 92     CBC  Recent Labs   Lab 10/02/20  1823   WBC 8.0   RBC 3.05*   HGB 10.2*   HCT 32.6*   *   MCH 33.4*   MCHC 31.3*   RDW 14.7   *     INR  Recent Labs   Lab 10/02/20  1823   INR 1.01     LFTs  Recent Labs   Lab 10/02/20  1823   ALKPHOS 110   *   ALT 96*   BILITOTAL 0.2   PROTTOTAL 8.8   ALBUMIN 3.8      PANC  Recent Labs   Lab 10/02/20  2019 10/02/20  1823   LIPASE 1,320* 1,734*       Imaging:  Reviewed in EMR

## 2020-10-03 NOTE — PLAN OF CARE
Patient c/o nausea, Zofran given with relief, abdominal pain relieved with Tylenol, NPO.Up ad sofy, stool sent.In IR procedure this afternoon.

## 2020-10-03 NOTE — PLAN OF CARE
Pt AVSS. Pt very anxious and requested melatonin for sleep. Given with good success and has slept between cares. Abd flat, +bs, denied pain or nausea. Lungs clear bilat. Pt IVF at 100/hr via piv. Pt UAL to BR gait steady, voiding adeq amts. NPO since 0000 for possible procedure in am. Pt has not had any stools tonight. Stool sample still needed. Monitor for pain.

## 2020-10-03 NOTE — H&P
[facility]     History and Physical - Hospitalist Service, Gold        Date of Admission:  10/2/2020    Assessment & Plan   Papa Ruiz is a 50 year old female admitted on 10/2/2020. She was admitted to [facility] for pancreatitis and GI bleed. She was discharged 9/30/20. During admit, she was found to have a  splenic artery aneurysm, plan for referral to IR for coiling of aneurysm as outpatient. She received a call today to tell her to proceed to the hospital for coiling. She has not consumed alcohol for 12 days, but still smokes cigarettes. She has a history of pancreatitis, anemia, peptic ulcer, and migraine headaches. She denies other medical history.      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  - IR consult  - NPO after MN  -  ml/hr while NPO    # intraductal papillary mucinous neoplasm (IPMN) vs chronic pancreatitis   Seen on imaging at [facility]  - repeat CT at [facility] shows:  Redemonstration of multiple pancreatic fluid collections/cystic  lesions in the pancreatic head, body, and tail. These may be sequelae  of the patient's prior pancreatitis and represents pancreatic  pseudocyst/walled off necrosis or alternatively IPMNs. Consider MRI  for further evaluation and GI consultation  - GI consult    # Anemia  Anemia possibly from ETOH abuse vs recent GI bleed  - trend CBC  - continue PTA protonix    # History of alcohol abuse  She states that she has not consumed alcohol for 12 days and is interested in quitting  - continue thiamine, MVI, and folic acid    #  Tobacco dependence               She is only smoking a few cigarrettes a day. History of smoking 3/4- 1 PPD for 25 years.       Diet:  NPO after MN  DVT Prophylaxis: Pneumatic Compression  Devices  Nguyen Catheter: not present  Code Status:   full code         Disposition Plan   Expected discharge: 2 - 3 days, recommended to prior living arrangement once adequate pain management/ tolerating PO medications.  Entered: ANYA Ortega CNP 10/02/2020, 8:51 PM     The patient's care was discussed with the Attending Physician, Dr. Smallwood.    ANYA Ortega CNP  [facility]   Contact information available via [facility] Paging/Directory  Please see sign in/sign out for up to date coverage information    ______________________________________________________________________    Chief Complaint   Splenic artery aneurysm     History is obtained from the patient    History of Present Illness   Papa Ruiz is a 50 year old female who presents to ER after receiving a call to come in for possibly coiling of splenic artery. She was recently admitted to [facility] where the original CT scan was done.    Review of Systems    Review Of Systems  Skin: scattered scratches LE  Eyes: negative  Ears/Nose/Throat: negative  Respiratory: No shortness of breath, dyspnea on exertion, cough, or hemoptysis  Cardiovascular: negative  Gastrointestinal: mild tenderness left abdomen  Genitourinary: negative  Musculoskeletal: negative  Neurologic: negative  Psychiatric: negative  Hematologic/Lymphatic/Immunologic: negative  Endocrine: negative      Past Medical History    I have reviewed this patient's medical history and updated it with pertinent information if needed.   Past Medical History:   Diagnosis Date     Alcohol-induced acute pancreatitis      Peptic ulcer        Past Surgical History   I have reviewed this patient's surgical history and updated it with pertinent information if needed.  Past Surgical History:   Procedure Laterality Date     GYN SURGERY         Social History   I have reviewed this patient's social history and updated it with pertinent information if  needed.  Social History     Tobacco Use     Smoking status: Current Every Day Smoker     Last attempt to quit: 1/22/2017     Years since quitting: 3.6     Smokeless tobacco: Never Used     Tobacco comment: 1-2 cigarettes a day   Substance Use Topics     Alcohol use: No     Comment: quit 12 days ago     Drug use: No       Family History   I have reviewed this patient's family history and updated it with pertinent information if needed.  Family History   Problem Relation Age of Onset     No Known Problems Mother      No Known Problems Father          Prior to Admission Medications   Prior to Admission Medications   Prescriptions Last Dose Informant Patient Reported? Taking?   Multiple Vitamins-Minerals (MULTIVITAMIN ADULT PO) 10/2/2020  Yes Yes   acetaminophen (TYLENOL) 500 MG tablet 10/2/2020  Yes Yes   Sig: Take 1,000 mg by mouth every 6 hours as needed for pain   folic acid (FOLVITE) 1 MG tablet 10/2/2020  Yes Yes   Sig: Take 1 mg by mouth daily   hydrOXYzine (VISTARIL) 25 MG capsule 10/2/2020  Yes Yes   Sig: Take 25 mg by mouth 4 times daily as needed for itching   ondansetron (ZOFRAN) 4 MG tablet 10/2/2020  Yes Yes   Sig: Take 4 mg by mouth every 6 hours as needed for nausea   thiamine (B-1) 100 MG tablet 10/2/2020  Yes Yes   Sig: Take 100 mg by mouth daily      Facility-Administered Medications: None     Allergies   Allergies   Allergen Reactions     Nkda [No Known Drug Allergies]        Physical Exam   Vital Signs: Temp: 97.9  F (36.6  C) Temp src: Oral BP: 118/76 Pulse: 74   Resp: 16 SpO2: 97 %      Weight: 0 lbs 0 oz    Physical Exam   Constitutional:   Resting comfortably   Head: Normocephalic and atraumatic.   Eyes: Conjunctivae are normal. Pupils are equal, round, and reactive to light.  Pharynx has no erythema or exudate, mucous membranes are moist  Neck:   No adenopathy, no bony tenderness  Cardiovascular: Regular rate and rhythm without murmurs or gallops  Pulmonary/Chest: Clear to auscultation  bilaterally, with no wheezes or retractions. No respiratory distress.  GI: Soft with good bowel sounds.  Non-tender, non-distended, with no guarding, no rebound, no peritoneal signs.   Back:  No bony or CVA tenderness   Musculoskeletal:  No edema or clubbing . Moves all extremities equally  Skin: Skin is warm and dry. No rash noted. Scratches LE  Neurological: Alert and oriented to person, place, and time. Nonfocal exam  Psychiatric:  Normal mood and affect.      Data   Data reviewed today: I reviewed all medications, new labs and imaging results over the last 24 hours.

## 2020-10-04 ENCOUNTER — PATIENT OUTREACH (OUTPATIENT)
Dept: CARE COORDINATION | Facility: CLINIC | Age: 50
End: 2020-10-04

## 2020-10-04 VITALS
DIASTOLIC BLOOD PRESSURE: 71 MMHG | BODY MASS INDEX: 19.19 KG/M2 | WEIGHT: 122.3 LBS | RESPIRATION RATE: 16 BRPM | HEART RATE: 74 BPM | SYSTOLIC BLOOD PRESSURE: 129 MMHG | OXYGEN SATURATION: 99 % | HEIGHT: 67 IN | TEMPERATURE: 97.1 F

## 2020-10-04 LAB
ERYTHROCYTE [DISTWIDTH] IN BLOOD BY AUTOMATED COUNT: 14.3 % (ref 10–15)
HCT VFR BLD AUTO: 26.5 % (ref 35–47)
HGB BLD-MCNC: 8.4 G/DL (ref 11.7–15.7)
MCH RBC QN AUTO: 33.7 PG (ref 26.5–33)
MCHC RBC AUTO-ENTMCNC: 31.7 G/DL (ref 31.5–36.5)
MCV RBC AUTO: 106 FL (ref 78–100)
PLATELET # BLD AUTO: 349 10E9/L (ref 150–450)
RBC # BLD AUTO: 2.49 10E12/L (ref 3.8–5.2)
WBC # BLD AUTO: 7.7 10E9/L (ref 4–11)

## 2020-10-04 PROCEDURE — 85027 COMPLETE CBC AUTOMATED: CPT | Performed by: INTERNAL MEDICINE

## 2020-10-04 PROCEDURE — 250N000013 HC RX MED GY IP 250 OP 250 PS 637: Performed by: INTERNAL MEDICINE

## 2020-10-04 PROCEDURE — 99239 HOSP IP/OBS DSCHRG MGMT >30: CPT | Performed by: INTERNAL MEDICINE

## 2020-10-04 PROCEDURE — 250N000013 HC RX MED GY IP 250 OP 250 PS 637: Performed by: NURSE PRACTITIONER

## 2020-10-04 PROCEDURE — 36415 COLL VENOUS BLD VENIPUNCTURE: CPT | Performed by: INTERNAL MEDICINE

## 2020-10-04 PROCEDURE — 250N000013 HC RX MED GY IP 250 OP 250 PS 637: Performed by: STUDENT IN AN ORGANIZED HEALTH CARE EDUCATION/TRAINING PROGRAM

## 2020-10-04 PROCEDURE — 04L43DZ OCCLUSION OF SPLENIC ARTERY WITH INTRALUMINAL DEVICE, PERCUTANEOUS APPROACH: ICD-10-PCS | Performed by: RADIOLOGY

## 2020-10-04 RX ORDER — OXYCODONE HYDROCHLORIDE 5 MG/1
5 TABLET ORAL EVERY 4 HOURS PRN
Qty: 6 TABLET | Refills: 0 | Status: SHIPPED | OUTPATIENT
Start: 2020-10-04 | End: 2020-10-05

## 2020-10-04 RX ORDER — OXYCODONE HYDROCHLORIDE 5 MG/1
5 TABLET ORAL EVERY 4 HOURS PRN
Status: DISCONTINUED | OUTPATIENT
Start: 2020-10-04 | End: 2020-10-04 | Stop reason: HOSPADM

## 2020-10-04 RX ADMIN — ACETAMINOPHEN 650 MG: 325 TABLET, FILM COATED ORAL at 04:39

## 2020-10-04 RX ADMIN — THIAMINE HCL TAB 100 MG 100 MG: 100 TAB at 09:55

## 2020-10-04 RX ADMIN — ACETAMINOPHEN 650 MG: 325 TABLET, FILM COATED ORAL at 09:54

## 2020-10-04 RX ADMIN — OXYCODONE HYDROCHLORIDE 5 MG: 5 TABLET ORAL at 09:54

## 2020-10-04 RX ADMIN — ACETAMINOPHEN 650 MG: 325 TABLET, FILM COATED ORAL at 00:24

## 2020-10-04 RX ADMIN — FOLIC ACID 1 MG: 1 TABLET ORAL at 09:54

## 2020-10-04 RX ADMIN — MULTIPLE VITAMINS W/ MINERALS TAB 1 TABLET: TAB at 09:54

## 2020-10-04 RX ADMIN — DOCUSATE SODIUM 100 MG: 100 CAPSULE, LIQUID FILLED ORAL at 09:54

## 2020-10-04 RX ADMIN — PANTOPRAZOLE SODIUM 40 MG: 40 TABLET, DELAYED RELEASE ORAL at 09:53

## 2020-10-04 ASSESSMENT — ACTIVITIES OF DAILY LIVING (ADL)
ADLS_ACUITY_SCORE: 15

## 2020-10-04 NOTE — DISCHARGE SUMMARY
[facility]   Hospitalist Discharge Summary      Date of Admission:  10/2/2020  Date of Discharge:  10/4/2020 10:27 AM  Discharging Provider: Vince Rashid MD  Discharge Team: Hospitalist Service, Gold 10    Discharge Diagnoses   splenic artery aneurysm  intraductal papillary mucinous neoplasm (IPMN) vs chronic pancreatitis  Anemia from chronic disease  History of alcohol abuse in remission    Follow-ups Needed After Discharge   Follow-up Appointments     Adult [facility] Follow-up and recommended labs and tests      Follow up with primary care provider, Taylor Calvert, within 7 days   for hospital follow- up.  Discuss meds for alcohol cravings  Follow up with IR  Follow up with GI at [facility]      Appointments on [place] and/or [facility] (with [facility] or [facility]   provider or service). Call [phone] if you haven't heard regarding   these appointments within 7 days of discharge.             Unresulted Labs Ordered in the Past 30 Days of this Admission     Date and Time Order Name Status Description    10/2/2020 2238 Elastase Fecal In process           Discharge Disposition   Discharged to home  Condition at discharge: Stable    Hospital Course   Joseph is a 50 year old female admitted on 10/2/2020. She was admitted to [facility] for pancreatitis and GI bleed. She was discharged 9/30/20. During admit, she was found to have a  splenic artery aneurysm, with a plan for referral to IR for coiling of aneurysm as outpatient.  However, this was reviewed after discharge, and due to imminent risk of rupture and bleeding to death, was admitted for urgent coiling.    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  Follow-up as planned by gastroenterology team at [facility], no need for GI specific follow-up with us at this time    # alcohol dependence: self medicates for anxiety.  No etoh since last admission,and denies cravings.  Discussed medications for alcohol dependence, and she declines at this time, due to no cravings.  In remission since last admission.    Consultations This Hospital Stay   INTERVENTIONAL RADIOLOGY ADULT/PEDS IP CONSULT  MEDICATION HISTORY IP PHARMACY CONSULT  GI PANCREATICOBILIARY ADULT IP CONSULT  INTERVENTIONAL RADIOLOGY ADULT/PEDS IP CONSULT    Code Status   Full Code    Time Spent on this Encounter   I, Vince Rashid MD, personally saw the patient today and spent greater than 30 minutes discharging this patient.       Vince Rashid MD  [facility] [address] [place] [id number]  Phone: [phone]  ______________________________________________________________________    Physical Exam   Vital Signs: Temp: 97.1  F (36.2  C) Temp src: Oral BP: 129/71 Pulse: 74   Resp: 16 SpO2: 99 % O2 Device: None (Room air) Oxygen Delivery: 2 LPM  Weight: 122 lbs 4.8 oz  Gen: NAD  HEENT: EOMI, PERRL, MMM   CV: extremities warm and well perfused  Resp: breathing comfortably on RA  Abd: soft, nontender  : deferred  Msk: no LE edema  Skin: no rashes  Neuro: nonfocal exam       Primary Care Physician   Taylor Calvert    Discharge Orders      Reason for your hospital stay    IF procedure     Adult [facility] Follow-up and recommended labs and tests    Follow up with primary care provider, Taylor Calvert, within 7 days for hospital follow- up.  Discuss meds for alcohol cravings  Follow up with IR  Follow up with GI at [facility]      Appointments on [place] and/or [facility] (with [facility] or [facility] provider or service). Call [phone] if you haven't heard  regarding these appointments within 7 days of discharge.     Activity    Your activity upon discharge: as instructed by IR     Full Code     Diet    Follow this diet upon discharge: regular       Significant Results and Procedures   Results for orders placed or performed during the hospital encounter of 10/02/20   CT Abdomen Pelvis w/o & w Contrast     Value    Radiologist flags Pancreatic cyst    Narrative    Examination: CT ABDOMEN PELVIS W/O & W CONTRAST, 10/2/2020 9:07  PM    Technique:  Helical CT images from the lung bases through the  symphysis pubis were obtained with contrast of both arterial and  portal venous phases.  Coronal reformatted images were generated at a  workstation for further assessment.  Source images reviewed as well as  3D and multi-planar reconstructions.    Contrast:  Contrast: 100 mL Isovue-370    Comparison: Outside abdomen and pelvis CT 9/22/2020, 7/12/2018.    History: Abd distension; Please asses for pseudoaneurysm.  Admitted  for management of pseudoaneurysm seen on outside CT.    Findings:    Abdomen and pelvis:   Liver: No suspicious liver lesions. 5 mm calcified nodule within the  right hepatic lobe likely sequelae of prior granulomatous disease.  Portal veins appear patent.  Gallbladder: No gallstones. No evidence of acute cholecystitis. No  intrahepatic or extrahepatic biliary duct dilatation.  Spleen: Normal size. Multiple punctate calcifications likely sequelae  of prior granulomatous disease.  Splenules.  Pancreas: Cystic appearing hypodensity within the pancreatic head  measuring 1.0 x 1.6 cm (series 9, image 36). This may be  representative of a IPMN. Additional hypodense cystic  lesions/collections within the pancreatic body and tail the largest of  which measure 0.6 x 1.3 cm and 1.2 x 1.4 cm respectively these may be  a sequelae of prior pancreatitis such as pancreatic pseudocyst or  IPMNs. Adjacent to the splenic vein near the pancreatic tail there is  a oval enhancing  collection seen first on the arterial sequences and  then further enhancing on the portal venous series, 2.2 x 1.8 x 2.2 cm  lesion with surrounding hypodense fluid this previously measured 1.3 x  1.5 x 1.6 cm on 9/22/2020. This is in the region of a splenic venous  branch at the splenic hilum, but remains concerning for a  pseudoaneurysm associated with the splenic artery. The portal vein,  superior mesenteric vein, and splenic veins appear patent.  Previously  seen possible non occlusive thrombus in the splenic vein is no longer  visualized.  Adrenal glands: No adrenal nodules.  Urinary system: No kidney masses. No hydronephrosis, hydroureter, or  obstructing renal stones. Urinary bladder is unremarkable.  Reproductive organs: Unremarkable.  Bowel: Multiple fluid-containing loops of small bowel. No abnormally  dilated loops of large or small bowel. No abnormal bowel wall  thickening or enhancement. The appendix is unremarkable.  Lymph nodes: No retroperitoneal, mesenteric, or pelvic  lymphadenopathy.  Fluid: No free fluid within the abdomen.  Vessels: No infrarenal aortic aneurysm. The celiac, left gastric,  common hepatic, and splenic arteries are patent.  Left gastric artery  appears to arise as a separate branch adjacent to the celiac access.    Lung bases: No consolidation or pleural effusion. 4 mm nodule in the  right middle lobe (series 9, image 39).    Bones and soft tissues: No suspicious osseous lesions.  Trace  retrolisthesis of L5 on S1.       Impression    Impression:   1.  Increased size of a focal vascular collection near the pancreatic  tail adjacent to a splenic hilum currently measuring up to 2.2 cm,  previously 1.6 cm on 9/22/2020.  Today's examination along with  outside CT raise the concern for a pseudoaneurysm, potentially splenic  artery (though no convincing connection visualized with CT) or related  to other smaller penetrating vessels associated with the pancreas.  IR  consultation is  pending.  2.  Redemonstration of multiple pancreatic fluid collections/cystic  lesions in the pancreatic head, body, and tail. These may be sequelae  of the patient's prior pancreatitis and represent pancreatic  pseudocyst/walled off necrosis or alternatively IPMNs. Consider MRI  for further evaluation and GI consultation.  3. Previously seen possible non occlusive thrombus in the splenic vein  is no longer visualized.  4. Peripancreatic inflammation and elevated lipase is consistent with  acute on chronic pancreatitis.  5. 4 mm nodule in the right middle lobe without significant change  since 2018.    Preliminary findings (#1) reviewed with ROSEANNE Ashley MD IR attending at  10:49PM.  Patient is on the IR schedule for tomorrow.    [Recommend Follow Up: Pancreatic cyst]    This report will be copied to the [facility] to ensure a  provider acknowledges the finding.     I have personally reviewed the examination and initial interpretation  and I agree with the findings.    MIREILEL KELLY MD   XR Chest 2 Views    Narrative    EXAM: XR Chest 2 VW  10/2/2020 8:13 PM     HISTORY: Prior to admission    COMPARISON:  None.  CT 9/22/2020 was available for correlation.    FINDINGS: PA and lateral radiographs of the chest. The mediastinal  border, cardiac silhouette, and pulmonary vasculature are within  normal limits. No focal airspace opacities. No pneumothorax. No  pleural effusions. Upper abdomen and osseous structures are within  normal limits. Calcification right upper quadrant correlates with a  coarse calcification seen in the liver on recent CT.      Impression    IMPRESSION: No focal airspace disease.    I have personally reviewed the examination and initial interpretation  and I agree with the findings.    MIREILLE KELLY MD       Discharge Medications   Current Discharge Medication List      CONTINUE these medications which have NOT CHANGED    Details   acetaminophen (TYLENOL) 500 MG tablet Take 1,000 mg by  mouth every 6 hours as needed for pain      folic acid (FOLVITE) 1 MG tablet Take 1 mg by mouth daily      hydrOXYzine (VISTARIL) 25 MG capsule Take 25 mg by mouth 4 times daily as needed for itching      Multiple Vitamins-Minerals (MULTIVITAMIN ADULT PO) Take 1 tablet by mouth daily       ondansetron (ZOFRAN) 4 MG tablet Take 4 mg by mouth every 6 hours as needed for nausea      thiamine (B-1) 100 MG tablet Take 100 mg by mouth daily           Allergies   Allergies   Allergen Reactions     Nkda [No Known Drug Allergies]

## 2020-10-04 NOTE — PROGRESS NOTES
Pain in abdomen, controlled with tylenol and position changes.   Right groin site with no bleeding, no hematoma.   CMS intact.   Pedal pulses normal.   Voiding spontaneously. No BM, +gas.   Discharged home.  Discussed all discharge paperwork and pt asked appropriate questions for discharge.

## 2020-10-04 NOTE — PLAN OF CARE
Assumed care of patient from 1900-0730:  Hypertensive, below notify parameters. AOVSS. Pt very anxious during bedrest. Doing better now that she has more freedom to move around in bed. Regular diet, tolerating well. Denies nausea. Pain in abdomen, controlled with tylenol and position changes. Right groin site with no bleeding, no hematoma. CMS intact. Pedal pulses normal. Voiding spontaneously. No BM, +gas. Pt resting between care. Continue POC.

## 2020-10-04 NOTE — CONSULTS
INTERVENTIONAL RADIOLOGY CONSULT NOTE    Reason for referral:   Splenic artery pseudoaneurysm.    History:   Patient is a 50-year-old female with history of alcohol use complicated with acute pancreatitis and GI bleed with recent hospital discharge.  At a post hospitalization cross-sectional imaging there is a new pseudoaneurysm on originate from the splenic artery.  Patient was admitted overnight for observation for her procedure today.  N.p.o. after midnight.  Labs WNL for procedure. Orders for NPO, scrubs and antibiotics have been entered. Consent will be done prior to procedure.    Labs:  Lab Results   Component Value Date    HGB 8.6 10/03/2020     Lab Results   Component Value Date     10/03/2020     Lab Results   Component Value Date    WBC 5.8 10/03/2020       Lab Results   Component Value Date    INR 1.01 10/02/2020       Lab Results   Component Value Date    PROTTOTAL 6.7 10/03/2020      Lab Results   Component Value Date    ALBUMIN 2.9 10/03/2020     Lab Results   Component Value Date    BILITOTAL 0.2 10/03/2020     No results found for: BILICONJ   Lab Results   Component Value Date    ALKPHOS 84 10/03/2020     Lab Results   Component Value Date     10/03/2020     Lab Results   Component Value Date    ALT 81 10/03/2020       Lab Results   Component Value Date    CR 0.55 10/03/2020     Lab Results   Component Value Date    BUN 7 10/03/2020       Imaging:   CTA abdomen and pelvis on 10/2/2020 demonstrated mild enlargement of pseudoaneurysm in the splenic artery measuring up to 2.2 cm without evidence of active extravasation.  No additional pseudoaneurysm was identified.  Additional CT findings are suggestive of sequela of acute pancreatitis.    Assessment:   50-year-old female with history of alcohol use with recent diagnosis of acute pancreatitis complicated with a 2.2 cm splenic artery pseudoaneurysm status post splenic artery coil embolization postop day #0.    Plan:   1.  Bed rest for  6-hour with right lower extremity straight.  Return to inpatient care overnight for potential postembolization syndrome and pain control.    2.  From interventional radiology perspective, patient may be discharged once the discharge criteria have been met.  CTA abdomen and pelvis prior to IR clinic follow-up in 1 month.  IR clinic nurse to contact patient for arrangement.    Discussed with Dr. Ashley, IR attending.    Miley Leyva MD  Diagnostic/Interventional Radiology Resident   IR pass pager: [phone]    I agree with the assessment and plan documented by Dr. Leyva.    Teri Ashley MD  Interventional Radiology   Pager [phone]

## 2020-10-05 ENCOUNTER — TELEPHONE (OUTPATIENT)
Dept: RADIOLOGY | Facility: CLINIC | Age: 50
End: 2020-10-05

## 2020-10-05 DIAGNOSIS — I72.9 PSEUDOANEURYSM (H): Primary | ICD-10-CM

## 2020-10-05 RX ORDER — OXYCODONE HYDROCHLORIDE 5 MG/1
5 TABLET ORAL EVERY 6 HOURS PRN
Qty: 12 TABLET | Refills: 0 | Status: SHIPPED | OUTPATIENT
Start: 2020-10-05 | End: 2020-10-05

## 2020-10-05 RX ORDER — OXYCODONE HYDROCHLORIDE 5 MG/1
5 TABLET ORAL EVERY 6 HOURS PRN
Qty: 20 TABLET | Refills: 0 | Status: SHIPPED | OUTPATIENT
Start: 2020-10-05

## 2020-10-05 NOTE — TELEPHONE ENCOUNTER
I called and left a voicemail including my call back number.  I called to find out how Papa is doing post embolization by Dr Ashley IR and get her scheduled for 1 month f/up CTA and Virtual visit with Dr Ashley.  GEORGETTE Rangel RN, BSN  Interventional Radiology Nurse Coordinator   Phone:  [phone]  Pt returned my call, she is ok, her access site looks good no concerns.  She states they recently moved into an apartment and she slept on the couch as she was concerned that her bed was too soft.  She woke up today with her whole left side hurting, from her neck, shoulder and side.  She is taking oxycodone and tylenol for pain.  She is eating and drinking ok, she hasn't had a stool since yesterday. We discussed taking an OTC stool softener.  Plan is for f/up CTA abd and clinic visit with Dr Ashley at one month post procedure.  GEORGETTE Rangel RN, BSN  Interventional Radiology Nurse Coordinator   Phone:  [phone]

## 2020-10-06 ENCOUNTER — TELEPHONE (OUTPATIENT)
Dept: RADIOLOGY | Facility: CLINIC | Age: 50
End: 2020-10-06

## 2020-10-06 DIAGNOSIS — I72.9 PSEUDOANEURYSM (H): Primary | ICD-10-CM

## 2020-10-06 NOTE — TELEPHONE ENCOUNTER
I spoke with Papa.  She is having lots of pain under her left rib, her left neck and shoulder pain are gone.  She also notes an almost quarter sized lump above her right groin access site that is squishy.  She took her dressing of this morning for the first, time.  She isn't sure if it was there yesterday.  She is crying with pain.  She doesn't have a ride for an ultrasound, but has contacted her sig other to drive her to [facility] for ultrasound tomorrow.  We will get arterial US to [facility] for PSA.  Pt is taking oxycodone 5 mg every 4-6 hours, and got a new prescription today from IR fellow Dr Leyva.  I called and confirmed with Papa her US scheduled tomorrow at the [facility], encouraged her to take the Oxycodone 5 mg every  6 hours prn, and alternate with tylenol or ibuprofen every 6 hours.  So she can find relief. Will reach out to Papa tomorrow or Friday with her US results.  GEORGETTE Rangel RN, BSN  Interventional Radiology Nurse Coordinator   Phone:  [phone]

## 2020-10-07 ENCOUNTER — ANCILLARY PROCEDURE (OUTPATIENT)
Dept: ULTRASOUND IMAGING | Facility: CLINIC | Age: 50
End: 2020-10-07
Attending: RADIOLOGY
Payer: COMMERCIAL

## 2020-10-07 DIAGNOSIS — I72.9 PSEUDOANEURYSM (H): ICD-10-CM

## 2020-10-07 LAB — ELASTASE PANC STL-MCNT: >800 UG/G

## 2020-10-07 PROCEDURE — 93926 LOWER EXTREMITY STUDY: CPT | Mod: RT

## 2020-10-07 NOTE — TELEPHONE ENCOUNTER
I returned Papa's call.  I called and left a voicemail including my call back number.  GEORGETTE Rangel, RN, BSN  Interventional Radiology Nurse Coordinator   Phone:  [phone]

## 2020-10-08 NOTE — TELEPHONE ENCOUNTER
I called to let patient know her US results from yesterday showed no pseudoaneurysm the area of concern is a hematoma.   Pt still having lots of pain in her low back and left side under her ribs.  She is only able to lay on her right side to avoid pain.  She feels better when she is up and walking.  Pt states she was told not to take advil or ASA after her admission to [facility].  This has been discussed with Dr Ashley, ok to take some ibuprofen to help with pain in the interim while recovering from splenic artery embolization procedure.  I will check in on Monday with [facility] to assess her pain.  GEORGETTE Rangel, RN, BSN  Interventional Radiology Nurse Coordinator   Phone:  [phone]

## 2020-10-13 ENCOUNTER — TELEPHONE (OUTPATIENT)
Dept: RADIOLOGY | Facility: CLINIC | Age: 50
End: 2020-10-13

## 2020-10-13 NOTE — TELEPHONE ENCOUNTER
I called and left a voicemail including my call back number..  I called to see how Dryndi's pain was.  GEORGETTE Rangel RN, BSN  Interventional Radiology Nurse Coordinator   Phone:  [phone]

## 2020-10-16 NOTE — TELEPHONE ENCOUNTER
Papa returned my call, her pain was better Monday 10/12 after alternating with ibuprofen.  GEORGETTE Rangel, RN, BSN  Interventional Radiology Nurse Coordinator   Phone:  [phone]

## 2020-10-29 ENCOUNTER — TELEPHONE (OUTPATIENT)
Dept: INTERVENTIONAL RADIOLOGY/VASCULAR | Facility: CLINIC | Age: 50
End: 2020-10-29

## 2020-10-29 NOTE — TELEPHONE ENCOUNTER
Patient called this afternoon because of suprapubic pain. She underwent embolization of splenic artery pseudoaneurysm on 10/3/2020 by Dr. Ashley.  She states that today she developed pain in the midline above her pubic bone that is intermittent, sometimes sharp, and has gotten slightly worse during the day.  She saw her primary care doctor who tested her urine which was negative for urinary tract infection.  She denies fever, dysuria or hematuria.  She has not strained herself, going to the gym or perform any vigorous activity recently.  She denies any pain or lump in her groin, including in the region of the right femoral artery puncture site.  She is concerned that her pain may be related to her embolization procedure.    She has not had any recent imaging of this area.    I discussed with her that the symptoms are unlikely related to the embolization procedure because the spleen in in her high left abdomen.  I will ask Dr. Ashley's nurse call the patient tomorrow to follow-up.  In the meantime, I discussed with her that if the pain becomes severe she should go to the ER.  She was in agreement with this plan.

## 2020-10-30 ENCOUNTER — TELEPHONE (OUTPATIENT)
Dept: RADIOLOGY | Facility: CLINIC | Age: 50
End: 2020-10-30

## 2020-10-30 NOTE — TELEPHONE ENCOUNTER
I called and spoke with Papa, had minute clinic pain above pubic bone yesterday and her UTI test was negative.  She has no more pain today.  GEORGETTE Rangel RN, BSN  Interventional Radiology Nurse Coordinator   Phone:  [phone]

## 2020-11-04 ENCOUNTER — TELEPHONE (OUTPATIENT)
Dept: RADIOLOGY | Facility: CLINIC | Age: 50
End: 2020-11-04

## 2020-11-04 NOTE — TELEPHONE ENCOUNTER
----- Message from Rebecca Rangel RN sent at 11/4/2020  8:17 AM CST -----  Regarding: RESCHEDULE  So it looks like Papa No showed for her f/up CT scan.  She is on for f/up virtual visit with Dr Ashley today.  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    Due anytime now.  Please let pt know that her appt today with Dr Ashley is cancelled.    Thank you,   GEORGETTE Rangel, RN, BSN  Interventional Radiology Nurse Coordinator   Phone:  [phone]

## 2020-11-12 ENCOUNTER — ANCILLARY PROCEDURE (OUTPATIENT)
Dept: CT IMAGING | Facility: CLINIC | Age: 50
End: 2020-11-12
Attending: RADIOLOGY
Payer: COMMERCIAL

## 2020-11-12 DIAGNOSIS — I72.9 PSEUDOANEURYSM (H): ICD-10-CM

## 2020-11-12 PROCEDURE — 74174 CTA ABD&PLVS W/CONTRAST: CPT | Performed by: RADIOLOGY

## 2020-11-12 RX ORDER — IOPAMIDOL 755 MG/ML
100 INJECTION, SOLUTION INTRAVASCULAR ONCE
Status: COMPLETED | OUTPATIENT
Start: 2020-11-12 | End: 2020-11-12

## 2020-11-12 RX ADMIN — IOPAMIDOL 100 ML: 755 INJECTION, SOLUTION INTRAVASCULAR at 17:06

## 2020-11-18 ENCOUNTER — VIRTUAL VISIT (OUTPATIENT)
Dept: RADIOLOGY | Facility: CLINIC | Age: 50
End: 2020-11-18
Attending: RADIOLOGY
Payer: COMMERCIAL

## 2020-11-18 DIAGNOSIS — I72.9 PSEUDOANEURYSM (H): Primary | ICD-10-CM

## 2020-11-18 PROCEDURE — 999N001193 HC VIDEO/TELEPHONE VISIT; NO CHARGE

## 2020-11-18 PROCEDURE — 99212 OFFICE O/P EST SF 10 MIN: CPT | Performed by: RADIOLOGY

## 2020-11-18 NOTE — PROGRESS NOTES
"Papa Ruiz is a 50 year old female who is being evaluated via a billable video visit.      The patient has been notified of following:     \"This video visit will be conducted via a call between you and your physician/provider. We have found that certain health care needs can be provided without the need for an in-person physical exam.  This service lets us provide the care you need with a video conversation.  If a prescription is necessary we can send it directly to your pharmacy.  If lab work is needed we can place an order for that and you can then stop by our lab to have the test done at a later time.    Video visits are billed at different rates depending on your insurance coverage.  Please reach out to your insurance provider with any questions.    If during the course of the call the physician/provider feels a video visit is not appropriate, you will not be charged for this service.\"    Patient has given verbal consent for Video visit? Yes  How would you like to obtain your AVS? Mail a copy  If you are dropped from the video visit, the video invite should be resent to: Text to cell phone: [phone]  Will anyone else be joining your video visit? No      Jolie Diaz MARCOS    Video-Visit Details    Patient not able to operate device for video visit, so converted to telephone visit.    Papa Ruiz is a 50 year old female who is being evaluated via a billable telephone visit.      The patient has been notified of following:     \"This telephone visit will be conducted via a call between you and your physician/provider. We have found that certain health care needs can be provided without the need for a physical exam.  This service lets us provide the care you need with a short phone conversation.  If a prescription is necessary we can send it directly to your pharmacy.  If lab work is needed we can place an order for that and you can then stop by our lab to have the test done at a later time.    Telephone " "visits are billed at different rates depending on your insurance coverage. During this emergency period, for some insurers they may be billed the same as an in-person visit.  Please reach out to your insurance provider with any questions.    If during the course of the call the physician/provider feels a telephone visit is not appropriate, you will not be charged for this service.\"    Patient has given verbal consent for Telephone visit?  Yes    What phone number would you like to be contacted at? [phone]    How would you like to obtain your AVS? Mail a copy    Phone call duration: 10 minutes    Teri Ashley MD                INTERVENTIONAL RADIOLOGY CONSULTATION    Name: Papa Ruiz  Age: 50 year old   Referring Physician: Dr. 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 She did have some LUQ pain, that resolved within 5 days post-procedure. She does feel some right groin \"cramping\" for the last week, that is rare to occasional. It's \"not steady\". No claudication.     No abdominal pain similar to prior pancreatitis.    Energy is great. No alcohol x 59 days.     No fever, cough, dyspnea, chest pain.     PAST MEDICAL HISTORY:   Past Medical History:   Diagnosis Date     Alcohol-induced acute pancreatitis      Peptic ulcer        PAST SURGICAL HISTORY:   Past Surgical History:   Procedure Laterality Date     GYN SURGERY       IR ANGIOGRAM SPLEEN  10/3/2020     IR VISCERAL EMBOLIZATION  10/3/2020       FAMILY HISTORY:   Family History   Problem Relation Age of Onset     No Known Problems Mother      No Known Problems Father        SOCIAL HISTORY:   Social History     Tobacco Use     Smoking status: Current Every Day Smoker     Last attempt to quit: 1/22/2017     Years since quitting: 3.8     Smokeless tobacco: Never Used     Tobacco comment: 1-2 cigarettes a day   Substance Use Topics     Alcohol use: No     Comment: quit 12 days ago "       PROBLEM LIST:   Patient Active Problem List    Diagnosis Date Noted     Aneurysm of splenic artery (H) 10/02/2020     Priority: Medium     Acute pancreatitis, unspecified complication status, unspecified pancreatitis type 10/02/2020     Priority: Medium     Acute blood loss anemia 09/19/2020     Priority: Medium     Alcohol dependence (H) 09/19/2020     Priority: Medium     Alcoholic hepatitis 09/19/2020     Priority: Medium     GI bleed 09/19/2020     Priority: Medium     Hypokalemia 09/19/2020     Priority: Medium     Hyponatremia 09/19/2020     Priority: Medium     Peptic ulcer disease 09/19/2020     Priority: Medium     Alcohol-induced acute pancreatitis 07/12/2018     Priority: Medium     Tobacco use disorder 02/19/2016     Priority: Medium     Transient neurologic deficit 10/14/2013     Priority: Medium     Disturbance of skin sensation 10/14/2013     Priority: Medium     Anxiety 10/14/2013     Priority: Medium       MEDICATIONS:   Prescription Medications as of 11/18/2020       Rx Number Disp Refills Start End Last Dispensed Date Next Fill Date Owning Pharmacy    acetaminophen (TYLENOL) 500 MG tablet            Sig: Take 1,000 mg by mouth every 6 hours as needed for pain    Class: Historical    Route: Oral    Multiple Vitamins-Minerals (MULTIVITAMIN ADULT PO)            Sig: Take 1 tablet by mouth daily     Class: Historical    Route: Oral    folic acid (FOLVITE) 1 MG tablet            Sig: Take 1 mg by mouth daily    Class: Historical    Route: Oral    hydrOXYzine (VISTARIL) 25 MG capsule            Sig: Take 25 mg by mouth 4 times daily as needed for itching    Class: Historical    Route: Oral    ondansetron (ZOFRAN) 4 MG tablet            Sig: Take 4 mg by mouth every 6 hours as needed for nausea    Class: Historical    Route: Oral    oxyCODONE (ROXICODONE) 5 MG tablet  20 tablet 0 10/5/2020    [facility] [place], [place] - [address]    Sig: Take 1 tablet (5 mg) by  mouth every 6 hours as needed for severe pain    Class: Local Print    Earliest Fill Date: 10/5/2020    Route: Oral    thiamine (B-1) 100 MG tablet            Sig: Take 100 mg by mouth daily    Class: Historical    Route: Oral          ALLERGIES:   Nkda [no known drug allergies]    ROS:  As above      Physical Examination:   VITALS:   [facility] 09/13/2016   Constitutional: alert and no distress. Answering questions appropriately.  Psychiatric: affect normal/bright and mentation seems normal.      Labs:    BMP RESULTS:  Lab Results   Component Value Date     10/03/2020    POTASSIUM 4.4 10/03/2020    CHLORIDE 110 (H) 10/03/2020    CO2 26 10/03/2020    ANIONGAP 4 10/03/2020    GLC 96 10/03/2020    BUN 7 10/03/2020    CR 0.55 10/03/2020    GFRESTIMATED >90 10/03/2020    GFRESTBLACK >90 10/03/2020    FELICIA 8.6 10/03/2020        CBC RESULTS:  Lab Results   Component Value Date    WBC 7.7 10/04/2020    RBC 2.49 (L) 10/04/2020    HGB 8.4 (L) 10/04/2020    HCT 26.5 (L) 10/04/2020     (H) 10/04/2020    MCH 33.7 (H) 10/04/2020    MCHC 31.7 10/04/2020    RDW 14.3 10/04/2020     10/04/2020       INR/PTT:  Lab Results   Component Value Date    INR 1.01 10/02/2020    PTT 27 08/06/2013       Diagnostic studies: Imaging reviewed by me.  CTA of the abdomen on 11/12/2020 shows no flow/successful occlusion of the splenic artery pseudoaneurysm.  Tiny splenic infarct is suspected.Small hypodense foci in and around the pancreas are similar in appearance to CT 10/2/2020, and given the were not present on a CT 7/12/2018, these are most likely due to pancreatitis, and less likely IPMN.      IMPRESSION:  1. Splenic hilum pseudoaneurysm resolved.     2. Focal pancreatic fluid collections/cystic lesions are similar in  appearance. IPMN cannot be excluded. Further evaluation with MR could  be considered, however, artifact from the embolization coils may  render images nondiagnostic.     3. Prominent bilateral ovarian veins. Correlate  for pelvic congestion  syndrome.       Assessment: 50-year-old female with alcohol induced pancreatitis, complicated by splenic artery pseudoaneurysm.  She is now 59 days sober and 6 weeks status post transarterial embolization, and CT angiogram demonstrates successful occlusion of the pseudoaneurysm.  Small pancreatic hypodensities are stable comparing to imaging 10/2/2020, and the differential diagnosis favors pancreatic pseudocysts given these were not present on CT dated 7/12/2018.  Although the differential diagnosis does include IPMN, these will be followed with her subsequent CTs.     Plan:  F/u CTA at 6 months post-embolization, then yearly thereafter.  I commended Ms. Ruiz on her current sobriety status, and encouraged her continue to abstain from alcohol.    It was a pleasure to speak to Ms. Ruiz on the telephone today.  Thank you for involving the interventional radiology service in her care.    I spent a total of 10 minutes on this telephone visit, over 50% time was for counseling and care coordination.    Teri Ashley MD  Interventional Radiology   Pager [phone]    CC  Patient Care Team:  Taylor Calvert PA-C as PCP - General (Physician Assistant)  Meredith Gar APRN CNP as Assigned PCP  SELF, REFERRED

## 2020-11-18 NOTE — LETTER
"    11/18/2020         RE: Papa Ruiz  [address] [address]        Dear Colleague,    Thank you for referring your patient, Papa Ruiz, to the [facility]. Please see a copy of my visit note below.    Papa Ruiz is a 50 year old female who is being evaluated via a billable video visit.      The patient has been notified of following:     \"This video visit will be conducted via a call between you and your physician/provider. We have found that certain health care needs can be provided without the need for an in-person physical exam.  This service lets us provide the care you need with a video conversation.  If a prescription is necessary we can send it directly to your pharmacy.  If lab work is needed we can place an order for that and you can then stop by our lab to have the test done at a later time.    Video visits are billed at different rates depending on your insurance coverage.  Please reach out to your insurance provider with any questions.    If during the course of the call the physician/provider feels a video visit is not appropriate, you will not be charged for this service.\"    Patient has given verbal consent for Video visit? Yes  How would you like to obtain your AVS? Mail a copy  If you are dropped from the video visit, the video invite should be resent to: Text to cell phone: [phone]  Will anyone else be joining your video visit? No      Jolie US    Video-Visit Details    Patient not able to operate device for video visit, so converted to telephone visit.    Papa Ruiz is a 50 year old female who is being evaluated via a billable telephone visit.      The patient has been notified of following:     \"This telephone visit will be conducted via a call between you and your physician/provider. We have found that certain health care needs can be provided without the need for a physical exam.  This service lets us provide " "the care you need with a short phone conversation.  If a prescription is necessary we can send it directly to your pharmacy.  If lab work is needed we can place an order for that and you can then stop by our lab to have the test done at a later time.    Telephone visits are billed at different rates depending on your insurance coverage. During this emergency period, for some insurers they may be billed the same as an in-person visit.  Please reach out to your insurance provider with any questions.    If during the course of the call the physician/provider feels a telephone visit is not appropriate, you will not be charged for this service.\"    Patient has given verbal consent for Telephone visit?  Yes    What phone number would you like to be contacted at? [phone]    How would you like to obtain your AVS? Mail a copy    Phone call duration: 10 minutes    Teri Ashley MD                INTERVENTIONAL RADIOLOGY CONSULTATION    Name: Papa Ruiz  Age: 50 year old   Referring Physician: Dr. 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 She did have some LUQ pain, that resolved within 5 days post-procedure. She does feel some right groin \"cramping\" for the last week, that is rare to occasional. It's \"not steady\". No claudication.     No abdominal pain similar to prior pancreatitis.    Energy is great. No alcohol x 59 days.     No fever, cough, dyspnea, chest pain.     PAST MEDICAL HISTORY:   Past Medical History:   Diagnosis Date     Alcohol-induced acute pancreatitis      Peptic ulcer        PAST SURGICAL HISTORY:   Past Surgical History:   Procedure Laterality Date     GYN SURGERY       IR ANGIOGRAM SPLEEN  10/3/2020     IR VISCERAL EMBOLIZATION  10/3/2020       FAMILY HISTORY:   Family History   Problem Relation Age of Onset     No Known Problems Mother      No Known Problems Father        SOCIAL HISTORY:   Social History     Tobacco Use     " Smoking status: Current Every Day Smoker     Last attempt to quit: 1/22/2017     Years since quitting: 3.8     Smokeless tobacco: Never Used     Tobacco comment: 1-2 cigarettes a day   Substance Use Topics     Alcohol use: No     Comment: quit 12 days ago       PROBLEM LIST:   Patient Active Problem List    Diagnosis Date Noted     Aneurysm of splenic artery (H) 10/02/2020     Priority: Medium     Acute pancreatitis, unspecified complication status, unspecified pancreatitis type 10/02/2020     Priority: Medium     Acute blood loss anemia 09/19/2020     Priority: Medium     Alcohol dependence (H) 09/19/2020     Priority: Medium     Alcoholic hepatitis 09/19/2020     Priority: Medium     GI bleed 09/19/2020     Priority: Medium     Hypokalemia 09/19/2020     Priority: Medium     Hyponatremia 09/19/2020     Priority: Medium     Peptic ulcer disease 09/19/2020     Priority: Medium     Alcohol-induced acute pancreatitis 07/12/2018     Priority: Medium     Tobacco use disorder 02/19/2016     Priority: Medium     Transient neurologic deficit 10/14/2013     Priority: Medium     Disturbance of skin sensation 10/14/2013     Priority: Medium     Anxiety 10/14/2013     Priority: Medium       MEDICATIONS:   Prescription Medications as of 11/18/2020       Rx Number Disp Refills Start End Last Dispensed Date Next Fill Date Owning Pharmacy    acetaminophen (TYLENOL) 500 MG tablet            Sig: Take 1,000 mg by mouth every 6 hours as needed for pain    Class: Historical    Route: Oral    Multiple Vitamins-Minerals (MULTIVITAMIN ADULT PO)            Sig: Take 1 tablet by mouth daily     Class: Historical    Route: Oral    folic acid (FOLVITE) 1 MG tablet            Sig: Take 1 mg by mouth daily    Class: Historical    Route: Oral    hydrOXYzine (VISTARIL) 25 MG capsule            Sig: Take 25 mg by mouth 4 times daily as needed for itching    Class: Historical    Route: Oral    ondansetron (ZOFRAN) 4 MG tablet            Sig: Take  4 mg by mouth every 6 hours as needed for nausea    Class: Historical    Route: Oral    oxyCODONE (ROXICODONE) 5 MG tablet  20 tablet 0 10/5/2020    [facility] [place], [place] - [address]    Sig: Take 1 tablet (5 mg) by mouth every 6 hours as needed for severe pain    Class: Local Print    Earliest Fill Date: 10/5/2020    Route: Oral    thiamine (B-1) 100 MG tablet            Sig: Take 100 mg by mouth daily    Class: Historical    Route: Oral          ALLERGIES:   Nkda [no known drug allergies]    ROS:  As above      Physical Examination:   VITALS:   [facility] 09/13/2016   Constitutional: alert and no distress. Answering questions appropriately.  Psychiatric: affect normal/bright and mentation seems normal.      Labs:    BMP RESULTS:  Lab Results   Component Value Date     10/03/2020    POTASSIUM 4.4 10/03/2020    CHLORIDE 110 (H) 10/03/2020    CO2 26 10/03/2020    ANIONGAP 4 10/03/2020    GLC 96 10/03/2020    BUN 7 10/03/2020    CR 0.55 10/03/2020    GFRESTIMATED >90 10/03/2020    GFRESTBLACK >90 10/03/2020    FELICIA 8.6 10/03/2020        CBC RESULTS:  Lab Results   Component Value Date    WBC 7.7 10/04/2020    RBC 2.49 (L) 10/04/2020    HGB 8.4 (L) 10/04/2020    HCT 26.5 (L) 10/04/2020     (H) 10/04/2020    MCH 33.7 (H) 10/04/2020    MCHC 31.7 10/04/2020    RDW 14.3 10/04/2020     10/04/2020       INR/PTT:  Lab Results   Component Value Date    INR 1.01 10/02/2020    PTT 27 08/06/2013       Diagnostic studies: Imaging reviewed by me.  CTA of the abdomen on 11/12/2020 shows no flow/successful occlusion of the splenic artery pseudoaneurysm.  Tiny splenic infarct is suspected.Small hypodense foci in and around the pancreas are similar in appearance to CT 10/2/2020, and given the were not present on a CT 7/12/2018, these are most likely due to pancreatitis, and less likely IPMN.      IMPRESSION:  1. Splenic hilum pseudoaneurysm resolved.     2. Focal pancreatic fluid  collections/cystic lesions are similar in  appearance. IPMN cannot be excluded. Further evaluation with MR could  be considered, however, artifact from the embolization coils may  render images nondiagnostic.     3. Prominent bilateral ovarian veins. Correlate for pelvic congestion  syndrome.       Assessment: 50-year-old female with alcohol induced pancreatitis, complicated by splenic artery pseudoaneurysm.  She is now 59 days sober and 6 weeks status post transarterial embolization, and CT angiogram demonstrates successful occlusion of the pseudoaneurysm.  Small pancreatic hypodensities are stable comparing to imaging 10/2/2020, and the differential diagnosis favors pancreatic pseudocysts given these were not present on CT dated 7/12/2018.  Although the differential diagnosis does include IPMN, these will be followed with her subsequent CTs.     Plan:  F/u CTA at 6 months post-embolization, then yearly thereafter.  I commended Ms. Ruiz on her current sobriety status, and encouraged her continue to abstain from alcohol.    It was a pleasure to speak to Ms. Ruiz on the telephone today.  Thank you for involving the interventional radiology service in her care.    I spent a total of 10 minutes on this telephone visit, over 50% time was for counseling and care coordination.    Teri Ashley MD  Interventional Radiology   Pager [phone]    CC  Patient Care Team:  Taylor Calvert PA-C as PCP - General (Physician Assistant)  Meredith Gar APRN CNP as Assigned PCP

## 2020-11-19 DIAGNOSIS — I72.8 ANEURYSM OF SPLENIC ARTERY (H): Primary | ICD-10-CM

## 2021-04-24 ENCOUNTER — ANCILLARY PROCEDURE (OUTPATIENT)
Dept: CT IMAGING | Facility: CLINIC | Age: 51
End: 2021-04-24
Attending: RADIOLOGY
Payer: COMMERCIAL

## 2021-04-24 DIAGNOSIS — I72.8 ANEURYSM OF SPLENIC ARTERY (H): ICD-10-CM

## 2021-04-24 LAB
CREAT BLD-MCNC: 0.7 MG/DL (ref 0.52–1.04)
GFR SERPL CREATININE-BSD FRML MDRD: 89 ML/MIN/{1.73_M2}

## 2021-04-24 PROCEDURE — 82565 ASSAY OF CREATININE: CPT | Performed by: PATHOLOGY

## 2021-04-24 PROCEDURE — 36415 COLL VENOUS BLD VENIPUNCTURE: CPT | Performed by: PATHOLOGY

## 2021-04-24 PROCEDURE — 74174 CTA ABD&PLVS W/CONTRAST: CPT | Performed by: RADIOLOGY

## 2021-04-24 RX ORDER — IOPAMIDOL 755 MG/ML
100 INJECTION, SOLUTION INTRAVASCULAR ONCE
Status: COMPLETED | OUTPATIENT
Start: 2021-04-24 | End: 2021-04-24

## 2021-04-24 RX ADMIN — IOPAMIDOL 100 ML: 755 INJECTION, SOLUTION INTRAVASCULAR at 09:23

## 2021-04-28 ENCOUNTER — VIRTUAL VISIT (OUTPATIENT)
Dept: RADIOLOGY | Facility: CLINIC | Age: 51
End: 2021-04-28
Payer: COMMERCIAL

## 2021-04-28 DIAGNOSIS — I72.8 ANEURYSM OF SPLENIC ARTERY (H): Primary | ICD-10-CM

## 2021-04-28 PROCEDURE — 99212 OFFICE O/P EST SF 10 MIN: CPT | Mod: 95 | Performed by: RADIOLOGY

## 2021-04-28 PROCEDURE — 999N000109 HC STATISTIC OP CR VISIT

## 2021-04-28 NOTE — PROGRESS NOTES
"Papa is a 50 year old who is being evaluated via a billable video visit.      How would you like to obtain your AVS? Mail a copy  If the video visit is dropped, the invitation should be resent by: Text to cell phone: [phone]  Will anyone else be joining your video visit? No    Vitals - Patient Reported  Weight (Patient Reported): 61.2 kg (135 lb)  Height (Patient Reported): 168.9 cm (5' 6.5\")  BMI (Based on Pt Reported Ht/Wt): 21.46  Pain Score: No Pain (0)    Rosalina Costa CMA on 4/28/2021 at 8:42 AM      Video Start Time: 0933 am  Video-Visit Details    Type of service:  Video Visit    Video End Time:9:54 AM    Originating Location (pt. Location): Home    Distant Location (provider location):  [facility]     Platform used for Video Visit: [employer]         INTERVENTIONAL RADIOLOGY CONSULTATION    Name: Papa Ruiz  Age: 50 year old   Referring Physician: Dr. Garcia   REASON FOR REFERRAL: follow-up splenic artery pseudoaneurysm s/p embolization     HPI: Doing well. Continues to abstain from EtOH. No abdominal pain, n/v, diarrhea, chest pain, dyspnea.    PAST MEDICAL HISTORY:   Past Medical History:   Diagnosis Date     Alcohol-induced acute pancreatitis      Peptic ulcer        ALLERGIES:   Nkda [no known drug allergies]    ROS:      Physical Examination:   VITALS:   [facility] 09/13/2016   GENERAL: Alert, no distress  SKIN: Visible skin clear. No significant rash, abnormal pigmentation or lesions.  PSYCH: Mentation appears normal, affect normal/bright, judgement and insight intact, normal speech  EYES: Eyes grossly normal to inspection. No discharge or erythema, or obvious scleral/conjunctival abnormalities.  RESP: No audible wheeze, cough, or visible cyanosis. No visible retractions or increased work of breathing.   NEURO: Cranial nerves grossly intact. Mentation and speech appropriate for age.      Diagnostic studies:   CTA reviewed by me shows treated splenic PSA with no e/o " flow. Stable pancreatic cysts.    Radiologist Impression:  IMPRESSION:  1. Embolized portions of the splenic artery conform to the  embolization coils. No splenic artery aneurysm demonstrated.      2. Focal pancreatic fluid collections/cystic lesions are similar in  appearance to previous studies. IPMN cannot be excluded.      3. Prominent bilateral ovarian veins.      DAVINA GARZA MD    Assessment:  50-year-old female with alcohol induced pancreatitis, complicated by splenic artery pseudoaneurysm.  She is now 7 months sober and 6 months status post transarterial embolization, and CT angiogram demonstrates successful occlusion of the pseudoaneurysm.  Small pancreatic hypodensities are stable comparing to imaging 10/2/2020, and the differential diagnosis favors pancreatic pseudocysts given these were not present on CT dated 7/12/2018.  Although the differential diagnosis does include IPMN, these will be followed with her subsequent CTs.      Plan:  1. Again recommend dedicated chest CT due to presence of pulmonary nodules and significant smoking history. She now sees MICHELLE Vora at [facility] ([phone]).  2. Abdominal CTA in one year to f/u splenic artery PSA s/p embolization.  3. I commended Ms. Ruiz on her continued alcohol cessation and encouraged her to continue that.  4. Smoking cessation discussed.    Plan:  F/u CTA at 6 months post-embolization, then yearly thereafter.  I commended Ms. Ruiz on her current sobriety status, and encouraged her continue to abstain from alcohol.     It was a pleasure to conduct this  Video visit with Ms. Ruiz on the telephone today.  Thank you for involving the interventional radiology service in her care.     I spent a total of 21 minutes on this video visit, over 50% time was for counseling and care coordination. On the day of clinic visit, I spent an additional 10 minutes reviewing CTA and additional 10 minutes  completing documentation.     Teri Ashley MD  Interventional Radiology           Pager [phone]       Review of the result(s) of each unique test - CTA abdomen      Video-Visit Details     Type of service:  Video Visit     Video Start and End Time:start 0933 am, end 09:54 am    Originating Location (pt. Location): Home     Distant Location (provider location):  [facility]      Platform used for Video Visit: [employer]       Patient Care Team:  Taylor Calvert PA-C as PCP - General (Physician Assistant)  Meredith Gar APRN CNP as Assigned PCP  SELF, REFERRED

## 2022-03-07 DIAGNOSIS — I72.8 ANEURYSM OF SPLENIC ARTERY (H): Primary | ICD-10-CM

## 2022-03-18 ENCOUNTER — TELEPHONE (OUTPATIENT)
Dept: ONCOLOGY | Facility: CLINIC | Age: 52
End: 2022-03-18
Payer: COMMERCIAL

## 2022-03-18 NOTE — TELEPHONE ENCOUNTER
----- Message from Rebecca Rangel RN sent at 3/18/2022  1:54 PM CDT -----  Regarding: RE: annual f/up CTA  splenic artery PSA s/p embolization  That's all you can do, your documentation letter and  has a call back number right??    Appreciate you!    GEORGETTE Rangel RN, BSN  Interventional Radiology Nurse Coordinator   Phone:  [phone]  ----- Message -----  From: Shama Becker  Sent: 3/18/2022   1:26 PM CDT  To: Rebecca Rangel, RN  Subject: RE: annual f/up CTA  splenic artery PSA s/p #    Sierra Mcintosh,    I've tried reaching this pt quite a few times - her number goes straight to  and she has no MyChart set up. I sent her a letter on 3/14 to schedule but haven't heard back yet. Thoughts?    Thanks  Shama  ----- Message -----  From: Shama Becker  Sent: 3/7/2022  10:58 AM CDT  To: Shama Becker  Subject: FW: annual f/up CTA  splenic artery PSA s/p #        ----- Message -----  From: Juan A Locke  Sent: 3/7/2022   8:11 AM CST  To: [facility]  Subject: FW: annual f/up CTA  splenic artery PSA s/p #      ----- Message -----  From: Rebecca Rangel, RN  Sent: 3/7/2022   8:09 AM CST  To: Paddy [facility]  Subject: annual f/up CTA  splenic artery PSA s/p embo#    HI please reach out to pt to schedule her for CTA abd pelv first and then on following Wed a video visit with Dr Ashley    Due approx 5/1/22    Thanks,    GEORGETTE Rangel, RN, BSN  Interventional Radiology Nurse Coordinator   Phone:  [phone]

## (undated) RX ORDER — LIDOCAINE HYDROCHLORIDE 10 MG/ML
INJECTION, SOLUTION EPIDURAL; INFILTRATION; INTRACAUDAL; PERINEURAL
Status: DISPENSED
Start: 2020-10-03

## (undated) RX ORDER — CEFAZOLIN SODIUM 2 G/100ML
INJECTION, SOLUTION INTRAVENOUS
Status: DISPENSED
Start: 2020-10-03

## (undated) RX ORDER — HEPARIN SODIUM 200 [USP'U]/100ML
INJECTION, SOLUTION INTRAVENOUS
Status: DISPENSED
Start: 2020-10-03

## (undated) RX ORDER — FENTANYL CITRATE 50 UG/ML
INJECTION, SOLUTION INTRAMUSCULAR; INTRAVENOUS
Status: DISPENSED
Start: 2020-10-03

## (undated) RX ORDER — DIPHENHYDRAMINE HYDROCHLORIDE 50 MG/ML
INJECTION INTRAMUSCULAR; INTRAVENOUS
Status: DISPENSED
Start: 2020-10-03

## (undated) RX ORDER — ONDANSETRON 2 MG/ML
INJECTION INTRAMUSCULAR; INTRAVENOUS
Status: DISPENSED
Start: 2020-10-03